# Patient Record
Sex: MALE | Race: WHITE | Employment: UNEMPLOYED | URBAN - METROPOLITAN AREA
[De-identification: names, ages, dates, MRNs, and addresses within clinical notes are randomized per-mention and may not be internally consistent; named-entity substitution may affect disease eponyms.]

---

## 2018-01-03 PROBLEM — S63.012A: Status: ACTIVE | Noted: 2018-01-03

## 2018-01-03 PROBLEM — S52.501A CLOSED FRACTURE OF RIGHT DISTAL RADIUS: Status: ACTIVE | Noted: 2018-01-03

## 2018-02-28 PROBLEM — S52.532D CLOSED COLLES' FRACTURE OF LEFT RADIUS WITH ROUTINE HEALING: Status: ACTIVE | Noted: 2018-02-28

## 2018-03-28 ENCOUNTER — TELEPHONE (OUTPATIENT)
Dept: ORTHOPEDIC SURGERY | Age: 58
End: 2018-03-28

## 2018-03-28 DIAGNOSIS — S52.532D CLOSED COLLES' FRACTURE OF LEFT RADIUS WITH ROUTINE HEALING: Primary | ICD-10-CM

## 2018-04-06 DIAGNOSIS — S52.501D CLOSED FRACTURE OF DISTAL END OF RIGHT RADIUS WITH ROUTINE HEALING, UNSPECIFIED FRACTURE MORPHOLOGY, SUBSEQUENT ENCOUNTER: Primary | ICD-10-CM

## 2018-04-11 ENCOUNTER — OFFICE VISIT (OUTPATIENT)
Dept: ORTHOPEDIC SURGERY | Age: 58
End: 2018-04-11
Payer: COMMERCIAL

## 2018-04-11 ENCOUNTER — HOSPITAL ENCOUNTER (OUTPATIENT)
Dept: GENERAL RADIOLOGY | Age: 58
Discharge: HOME | End: 2018-04-13
Payer: COMMERCIAL

## 2018-04-11 VITALS
HEART RATE: 68 BPM | HEIGHT: 71 IN | SYSTOLIC BLOOD PRESSURE: 152 MMHG | TEMPERATURE: 98 F | RESPIRATION RATE: 18 BRPM | WEIGHT: 180 LBS | DIASTOLIC BLOOD PRESSURE: 85 MMHG | BODY MASS INDEX: 25.2 KG/M2

## 2018-04-11 DIAGNOSIS — S52.501D CLOSED FRACTURE OF DISTAL END OF RIGHT RADIUS WITH ROUTINE HEALING, UNSPECIFIED FRACTURE MORPHOLOGY, SUBSEQUENT ENCOUNTER: Primary | ICD-10-CM

## 2018-04-11 DIAGNOSIS — S52.501D CLOSED FRACTURE OF DISTAL END OF RIGHT RADIUS WITH ROUTINE HEALING, UNSPECIFIED FRACTURE MORPHOLOGY, SUBSEQUENT ENCOUNTER: ICD-10-CM

## 2018-04-11 PROCEDURE — 99213 OFFICE O/P EST LOW 20 MIN: CPT | Performed by: ORTHOPAEDIC SURGERY

## 2018-04-11 PROCEDURE — 73110 X-RAY EXAM OF WRIST: CPT

## 2018-04-20 ENCOUNTER — HOSPITAL ENCOUNTER (OUTPATIENT)
Dept: OCCUPATIONAL THERAPY | Age: 58
Setting detail: THERAPIES SERIES
Discharge: HOME | End: 2018-04-20
Payer: COMMERCIAL

## 2018-04-20 PROCEDURE — 97110 THERAPEUTIC EXERCISES: CPT

## 2018-04-20 PROCEDURE — 97022 WHIRLPOOL THERAPY: CPT

## 2018-04-23 ENCOUNTER — HOSPITAL ENCOUNTER (OUTPATIENT)
Dept: OCCUPATIONAL THERAPY | Age: 58
Setting detail: THERAPIES SERIES
Discharge: HOME | End: 2018-04-23
Payer: COMMERCIAL

## 2018-04-23 PROCEDURE — 97140 MANUAL THERAPY 1/> REGIONS: CPT | Performed by: OCCUPATIONAL THERAPIST

## 2018-04-23 PROCEDURE — L3906 WHO W/O JOINTS CF: HCPCS | Performed by: OCCUPATIONAL THERAPIST

## 2018-04-23 PROCEDURE — 97760 ORTHOTIC MGMT&TRAING 1ST ENC: CPT | Performed by: OCCUPATIONAL THERAPIST

## 2018-04-23 PROCEDURE — 97022 WHIRLPOOL THERAPY: CPT | Performed by: OCCUPATIONAL THERAPIST

## 2018-04-25 ENCOUNTER — HOSPITAL ENCOUNTER (OUTPATIENT)
Dept: OCCUPATIONAL THERAPY | Age: 58
Setting detail: THERAPIES SERIES
Discharge: HOME | End: 2018-04-25
Payer: COMMERCIAL

## 2018-04-25 PROCEDURE — 97110 THERAPEUTIC EXERCISES: CPT

## 2018-04-25 PROCEDURE — 97022 WHIRLPOOL THERAPY: CPT

## 2018-04-25 PROCEDURE — 97140 MANUAL THERAPY 1/> REGIONS: CPT

## 2018-04-27 ENCOUNTER — HOSPITAL ENCOUNTER (OUTPATIENT)
Dept: OCCUPATIONAL THERAPY | Age: 58
Setting detail: THERAPIES SERIES
Discharge: HOME | End: 2018-04-27
Payer: COMMERCIAL

## 2018-04-27 PROCEDURE — 97110 THERAPEUTIC EXERCISES: CPT | Performed by: OCCUPATIONAL THERAPIST

## 2018-04-27 PROCEDURE — 97140 MANUAL THERAPY 1/> REGIONS: CPT | Performed by: OCCUPATIONAL THERAPIST

## 2018-04-27 PROCEDURE — 97022 WHIRLPOOL THERAPY: CPT | Performed by: OCCUPATIONAL THERAPIST

## 2018-04-30 ENCOUNTER — HOSPITAL ENCOUNTER (OUTPATIENT)
Dept: OCCUPATIONAL THERAPY | Age: 58
Setting detail: THERAPIES SERIES
Discharge: HOME | End: 2018-04-30
Payer: COMMERCIAL

## 2018-04-30 PROCEDURE — 97140 MANUAL THERAPY 1/> REGIONS: CPT | Performed by: OCCUPATIONAL THERAPIST

## 2018-04-30 PROCEDURE — 97018 PARAFFIN BATH THERAPY: CPT | Performed by: OCCUPATIONAL THERAPIST

## 2018-04-30 PROCEDURE — 97110 THERAPEUTIC EXERCISES: CPT | Performed by: OCCUPATIONAL THERAPIST

## 2018-05-02 ENCOUNTER — HOSPITAL ENCOUNTER (OUTPATIENT)
Dept: OCCUPATIONAL THERAPY | Age: 58
Setting detail: THERAPIES SERIES
Discharge: HOME | End: 2018-05-02
Payer: COMMERCIAL

## 2018-05-02 PROCEDURE — 97140 MANUAL THERAPY 1/> REGIONS: CPT

## 2018-05-02 PROCEDURE — 97110 THERAPEUTIC EXERCISES: CPT

## 2018-05-04 ENCOUNTER — HOSPITAL ENCOUNTER (OUTPATIENT)
Dept: OCCUPATIONAL THERAPY | Age: 58
Setting detail: THERAPIES SERIES
Discharge: HOME | End: 2018-05-04
Payer: COMMERCIAL

## 2018-05-04 PROCEDURE — 97110 THERAPEUTIC EXERCISES: CPT | Performed by: OCCUPATIONAL THERAPIST

## 2018-05-04 PROCEDURE — 97140 MANUAL THERAPY 1/> REGIONS: CPT | Performed by: OCCUPATIONAL THERAPIST

## 2018-05-04 PROCEDURE — 97022 WHIRLPOOL THERAPY: CPT | Performed by: OCCUPATIONAL THERAPIST

## 2018-05-07 ENCOUNTER — HOSPITAL ENCOUNTER (OUTPATIENT)
Dept: OCCUPATIONAL THERAPY | Age: 58
Setting detail: THERAPIES SERIES
Discharge: HOME | End: 2018-05-07
Payer: COMMERCIAL

## 2018-05-07 PROCEDURE — 97140 MANUAL THERAPY 1/> REGIONS: CPT | Performed by: OCCUPATIONAL THERAPIST

## 2018-05-07 PROCEDURE — 97022 WHIRLPOOL THERAPY: CPT | Performed by: OCCUPATIONAL THERAPIST

## 2018-05-07 PROCEDURE — 97110 THERAPEUTIC EXERCISES: CPT | Performed by: OCCUPATIONAL THERAPIST

## 2018-05-09 ENCOUNTER — HOSPITAL ENCOUNTER (OUTPATIENT)
Dept: OCCUPATIONAL THERAPY | Age: 58
Setting detail: THERAPIES SERIES
Discharge: HOME | End: 2018-05-09
Payer: COMMERCIAL

## 2018-05-09 PROCEDURE — 97110 THERAPEUTIC EXERCISES: CPT

## 2018-05-09 PROCEDURE — 97140 MANUAL THERAPY 1/> REGIONS: CPT

## 2018-05-09 PROCEDURE — 97022 WHIRLPOOL THERAPY: CPT

## 2018-05-11 ENCOUNTER — HOSPITAL ENCOUNTER (OUTPATIENT)
Dept: OCCUPATIONAL THERAPY | Age: 58
Setting detail: THERAPIES SERIES
Discharge: HOME | End: 2018-05-11
Payer: COMMERCIAL

## 2018-05-11 PROCEDURE — 97140 MANUAL THERAPY 1/> REGIONS: CPT | Performed by: OCCUPATIONAL THERAPIST

## 2018-05-11 PROCEDURE — 97022 WHIRLPOOL THERAPY: CPT | Performed by: OCCUPATIONAL THERAPIST

## 2018-05-11 PROCEDURE — 97110 THERAPEUTIC EXERCISES: CPT | Performed by: OCCUPATIONAL THERAPIST

## 2018-05-16 ENCOUNTER — HOSPITAL ENCOUNTER (OUTPATIENT)
Dept: OCCUPATIONAL THERAPY | Age: 58
Setting detail: THERAPIES SERIES
Discharge: HOME | End: 2018-05-16
Payer: COMMERCIAL

## 2018-05-16 PROCEDURE — 97140 MANUAL THERAPY 1/> REGIONS: CPT | Performed by: OCCUPATIONAL THERAPIST

## 2018-05-16 PROCEDURE — 97022 WHIRLPOOL THERAPY: CPT | Performed by: OCCUPATIONAL THERAPIST

## 2018-05-16 PROCEDURE — 97110 THERAPEUTIC EXERCISES: CPT | Performed by: OCCUPATIONAL THERAPIST

## 2018-05-18 ENCOUNTER — APPOINTMENT (OUTPATIENT)
Dept: OCCUPATIONAL THERAPY | Age: 58
End: 2018-05-18
Payer: COMMERCIAL

## 2018-05-18 ENCOUNTER — HOSPITAL ENCOUNTER (OUTPATIENT)
Dept: OCCUPATIONAL THERAPY | Age: 58
Setting detail: THERAPIES SERIES
Discharge: HOME | End: 2018-05-18
Payer: COMMERCIAL

## 2018-05-18 PROCEDURE — 97140 MANUAL THERAPY 1/> REGIONS: CPT | Performed by: OCCUPATIONAL THERAPIST

## 2018-05-18 PROCEDURE — 97022 WHIRLPOOL THERAPY: CPT | Performed by: OCCUPATIONAL THERAPIST

## 2018-05-18 PROCEDURE — 97110 THERAPEUTIC EXERCISES: CPT | Performed by: OCCUPATIONAL THERAPIST

## 2018-05-21 ENCOUNTER — HOSPITAL ENCOUNTER (OUTPATIENT)
Dept: OCCUPATIONAL THERAPY | Age: 58
Setting detail: THERAPIES SERIES
Discharge: HOME | End: 2018-05-21
Payer: COMMERCIAL

## 2018-05-21 PROCEDURE — 97110 THERAPEUTIC EXERCISES: CPT

## 2018-05-21 PROCEDURE — 97022 WHIRLPOOL THERAPY: CPT

## 2018-05-21 PROCEDURE — 97140 MANUAL THERAPY 1/> REGIONS: CPT

## 2018-05-22 ENCOUNTER — OFFICE VISIT (OUTPATIENT)
Dept: ORTHOPEDIC SURGERY | Age: 58
End: 2018-05-22
Payer: COMMERCIAL

## 2018-05-22 ENCOUNTER — APPOINTMENT (OUTPATIENT)
Dept: OCCUPATIONAL THERAPY | Age: 58
End: 2018-05-22
Payer: COMMERCIAL

## 2018-05-22 VITALS
HEIGHT: 72 IN | SYSTOLIC BLOOD PRESSURE: 141 MMHG | WEIGHT: 180 LBS | HEART RATE: 65 BPM | DIASTOLIC BLOOD PRESSURE: 88 MMHG | BODY MASS INDEX: 24.38 KG/M2

## 2018-05-22 DIAGNOSIS — S52.501D CLOSED FRACTURE OF DISTAL END OF RIGHT RADIUS WITH ROUTINE HEALING, UNSPECIFIED FRACTURE MORPHOLOGY, SUBSEQUENT ENCOUNTER: Primary | ICD-10-CM

## 2018-05-22 PROCEDURE — 99212 OFFICE O/P EST SF 10 MIN: CPT | Performed by: ORTHOPAEDIC SURGERY

## 2018-05-24 ENCOUNTER — HOSPITAL ENCOUNTER (OUTPATIENT)
Dept: OCCUPATIONAL THERAPY | Age: 58
Setting detail: THERAPIES SERIES
Discharge: HOME | End: 2018-05-24
Payer: COMMERCIAL

## 2018-05-24 PROCEDURE — 97140 MANUAL THERAPY 1/> REGIONS: CPT

## 2018-05-24 PROCEDURE — 97022 WHIRLPOOL THERAPY: CPT

## 2018-05-24 PROCEDURE — 97110 THERAPEUTIC EXERCISES: CPT

## 2018-05-29 ENCOUNTER — HOSPITAL ENCOUNTER (OUTPATIENT)
Dept: OCCUPATIONAL THERAPY | Age: 58
Setting detail: THERAPIES SERIES
Discharge: HOME | End: 2018-05-29
Payer: COMMERCIAL

## 2018-05-29 PROCEDURE — 97140 MANUAL THERAPY 1/> REGIONS: CPT

## 2018-05-29 PROCEDURE — 97110 THERAPEUTIC EXERCISES: CPT

## 2018-05-29 PROCEDURE — 97022 WHIRLPOOL THERAPY: CPT

## 2018-05-30 ENCOUNTER — HOSPITAL ENCOUNTER (OUTPATIENT)
Dept: OCCUPATIONAL THERAPY | Age: 58
Setting detail: THERAPIES SERIES
Discharge: HOME | End: 2018-05-30
Payer: COMMERCIAL

## 2018-05-30 PROCEDURE — 97140 MANUAL THERAPY 1/> REGIONS: CPT

## 2018-05-30 PROCEDURE — 97110 THERAPEUTIC EXERCISES: CPT

## 2018-05-30 PROCEDURE — 97022 WHIRLPOOL THERAPY: CPT

## 2018-06-01 ENCOUNTER — HOSPITAL ENCOUNTER (OUTPATIENT)
Dept: OCCUPATIONAL THERAPY | Age: 58
Setting detail: THERAPIES SERIES
Discharge: HOME | End: 2018-06-01
Payer: COMMERCIAL

## 2018-06-04 ENCOUNTER — HOSPITAL ENCOUNTER (OUTPATIENT)
Dept: OCCUPATIONAL THERAPY | Age: 58
Setting detail: THERAPIES SERIES
Discharge: HOME | End: 2018-06-04
Payer: COMMERCIAL

## 2018-06-04 PROCEDURE — 97110 THERAPEUTIC EXERCISES: CPT

## 2018-06-04 PROCEDURE — 97140 MANUAL THERAPY 1/> REGIONS: CPT

## 2018-06-04 PROCEDURE — 97022 WHIRLPOOL THERAPY: CPT

## 2018-06-06 ENCOUNTER — HOSPITAL ENCOUNTER (OUTPATIENT)
Dept: OCCUPATIONAL THERAPY | Age: 58
Setting detail: THERAPIES SERIES
Discharge: HOME | End: 2018-06-06
Payer: COMMERCIAL

## 2018-06-06 PROCEDURE — 97110 THERAPEUTIC EXERCISES: CPT

## 2018-06-06 PROCEDURE — 97140 MANUAL THERAPY 1/> REGIONS: CPT

## 2018-06-06 PROCEDURE — 97760 ORTHOTIC MGMT&TRAING 1ST ENC: CPT

## 2018-06-08 ENCOUNTER — HOSPITAL ENCOUNTER (OUTPATIENT)
Dept: OCCUPATIONAL THERAPY | Age: 58
Setting detail: THERAPIES SERIES
Discharge: HOME | End: 2018-06-08
Payer: COMMERCIAL

## 2018-06-08 PROCEDURE — 97022 WHIRLPOOL THERAPY: CPT | Performed by: OCCUPATIONAL THERAPIST

## 2018-06-08 PROCEDURE — 97140 MANUAL THERAPY 1/> REGIONS: CPT | Performed by: OCCUPATIONAL THERAPIST

## 2018-06-08 PROCEDURE — 97110 THERAPEUTIC EXERCISES: CPT | Performed by: OCCUPATIONAL THERAPIST

## 2018-06-11 ENCOUNTER — HOSPITAL ENCOUNTER (OUTPATIENT)
Dept: OCCUPATIONAL THERAPY | Age: 58
Setting detail: THERAPIES SERIES
Discharge: HOME | End: 2018-06-11
Payer: COMMERCIAL

## 2018-06-11 PROCEDURE — 97140 MANUAL THERAPY 1/> REGIONS: CPT

## 2018-06-11 PROCEDURE — 97110 THERAPEUTIC EXERCISES: CPT

## 2018-06-13 ENCOUNTER — HOSPITAL ENCOUNTER (OUTPATIENT)
Dept: OCCUPATIONAL THERAPY | Age: 58
Setting detail: THERAPIES SERIES
Discharge: HOME | End: 2018-06-13
Payer: COMMERCIAL

## 2018-06-13 PROCEDURE — 97110 THERAPEUTIC EXERCISES: CPT

## 2018-06-13 PROCEDURE — 97022 WHIRLPOOL THERAPY: CPT

## 2018-06-13 PROCEDURE — 97140 MANUAL THERAPY 1/> REGIONS: CPT

## 2018-06-15 ENCOUNTER — HOSPITAL ENCOUNTER (OUTPATIENT)
Dept: OCCUPATIONAL THERAPY | Age: 58
Setting detail: THERAPIES SERIES
Discharge: HOME | End: 2018-06-15
Payer: COMMERCIAL

## 2018-06-15 PROCEDURE — 97022 WHIRLPOOL THERAPY: CPT | Performed by: OCCUPATIONAL THERAPIST

## 2018-06-15 PROCEDURE — 97110 THERAPEUTIC EXERCISES: CPT | Performed by: OCCUPATIONAL THERAPIST

## 2018-06-15 PROCEDURE — 97140 MANUAL THERAPY 1/> REGIONS: CPT | Performed by: OCCUPATIONAL THERAPIST

## 2018-06-18 ENCOUNTER — HOSPITAL ENCOUNTER (OUTPATIENT)
Dept: OCCUPATIONAL THERAPY | Age: 58
Setting detail: THERAPIES SERIES
Discharge: HOME | End: 2018-06-18
Payer: COMMERCIAL

## 2018-06-18 PROCEDURE — 97110 THERAPEUTIC EXERCISES: CPT

## 2018-06-18 PROCEDURE — 97140 MANUAL THERAPY 1/> REGIONS: CPT

## 2018-06-20 ENCOUNTER — HOSPITAL ENCOUNTER (OUTPATIENT)
Dept: OCCUPATIONAL THERAPY | Age: 58
Setting detail: THERAPIES SERIES
Discharge: HOME | End: 2018-06-20
Payer: COMMERCIAL

## 2018-06-20 PROCEDURE — 97110 THERAPEUTIC EXERCISES: CPT

## 2018-06-20 PROCEDURE — 97140 MANUAL THERAPY 1/> REGIONS: CPT

## 2018-06-21 ENCOUNTER — HOSPITAL ENCOUNTER (OUTPATIENT)
Dept: OCCUPATIONAL THERAPY | Age: 58
Setting detail: THERAPIES SERIES
Discharge: HOME | End: 2018-06-21
Payer: COMMERCIAL

## 2018-06-21 PROCEDURE — 97022 WHIRLPOOL THERAPY: CPT

## 2018-06-21 PROCEDURE — 97140 MANUAL THERAPY 1/> REGIONS: CPT

## 2018-06-21 PROCEDURE — 97110 THERAPEUTIC EXERCISES: CPT

## 2018-06-25 ENCOUNTER — HOSPITAL ENCOUNTER (OUTPATIENT)
Dept: OCCUPATIONAL THERAPY | Age: 58
Setting detail: THERAPIES SERIES
Discharge: HOME | End: 2018-06-25
Payer: COMMERCIAL

## 2018-06-25 PROCEDURE — 97022 WHIRLPOOL THERAPY: CPT

## 2018-06-25 PROCEDURE — 97140 MANUAL THERAPY 1/> REGIONS: CPT

## 2018-06-25 PROCEDURE — 97110 THERAPEUTIC EXERCISES: CPT

## 2018-06-27 ENCOUNTER — OFFICE VISIT (OUTPATIENT)
Dept: ORTHOPEDIC SURGERY | Age: 58
End: 2018-06-27
Payer: COMMERCIAL

## 2018-06-27 ENCOUNTER — HOSPITAL ENCOUNTER (OUTPATIENT)
Dept: OCCUPATIONAL THERAPY | Age: 58
Setting detail: THERAPIES SERIES
Discharge: HOME | End: 2018-06-27
Payer: COMMERCIAL

## 2018-06-27 VITALS
BODY MASS INDEX: 25.2 KG/M2 | SYSTOLIC BLOOD PRESSURE: 111 MMHG | DIASTOLIC BLOOD PRESSURE: 69 MMHG | HEIGHT: 71 IN | WEIGHT: 180 LBS | HEART RATE: 89 BPM

## 2018-06-27 DIAGNOSIS — S52.532D CLOSED COLLES' FRACTURE OF LEFT RADIUS WITH ROUTINE HEALING: ICD-10-CM

## 2018-06-27 PROCEDURE — 97018 PARAFFIN BATH THERAPY: CPT

## 2018-06-27 PROCEDURE — 97140 MANUAL THERAPY 1/> REGIONS: CPT

## 2018-06-27 PROCEDURE — 99212 OFFICE O/P EST SF 10 MIN: CPT | Performed by: NURSE PRACTITIONER

## 2018-06-27 PROCEDURE — 99213 OFFICE O/P EST LOW 20 MIN: CPT | Performed by: ORTHOPAEDIC SURGERY

## 2018-06-27 PROCEDURE — 97110 THERAPEUTIC EXERCISES: CPT

## 2018-06-29 ENCOUNTER — HOSPITAL ENCOUNTER (OUTPATIENT)
Dept: OCCUPATIONAL THERAPY | Age: 58
Setting detail: THERAPIES SERIES
Discharge: HOME | End: 2018-06-29
Payer: COMMERCIAL

## 2018-06-29 PROCEDURE — 97022 WHIRLPOOL THERAPY: CPT | Performed by: OCCUPATIONAL THERAPIST

## 2018-06-29 PROCEDURE — 97140 MANUAL THERAPY 1/> REGIONS: CPT | Performed by: OCCUPATIONAL THERAPIST

## 2018-06-29 PROCEDURE — 97110 THERAPEUTIC EXERCISES: CPT | Performed by: OCCUPATIONAL THERAPIST

## 2018-07-02 ENCOUNTER — HOSPITAL ENCOUNTER (OUTPATIENT)
Dept: OCCUPATIONAL THERAPY | Age: 58
Setting detail: THERAPIES SERIES
Discharge: HOME | End: 2018-07-02
Payer: COMMERCIAL

## 2018-07-02 PROCEDURE — 97110 THERAPEUTIC EXERCISES: CPT

## 2018-07-02 PROCEDURE — 97140 MANUAL THERAPY 1/> REGIONS: CPT

## 2018-07-02 PROCEDURE — 97022 WHIRLPOOL THERAPY: CPT

## 2018-07-02 NOTE — PROGRESS NOTES
Occupational Therapy    OCCUPATIONAL THERAPY PROGRESS NOTE    Date:  2018   Initial Evaluation Date: 2018     Patient Name:  Liz Karimi                 :  1960     Restrictions/Precautions: Follow distal radius fx protocol (ORIF), low fall risk  Diagnosis: Colles' fracture of left radius, initial encounter for closed fracture, Displaced fracture of left ulna styloid process (H84.393, S52.612)                                                         Insurance/Certification information:  [facility] (Approved 18 sessions through 2018)  Referring Physician:  Dr. Rajinder Mejia MD, Osbaldo Calhoun NP  Date of Surgery/Injury: 1/3/2018 date of injury, 2018 date of surgery  Plan of care signed (Y/N):  No  Visit# / total visits: 15 / 25. . New C9 approval start date 18- 7/15/18 for 18 visits (3 x a wk)    Pain Level:  0/10 @ rest,  4 on scale of 1-10 with exercise and resistive use, aching, pressure and tight (pulling)    Subjective: Pt reports, \" I saw the doctor last week again and he was real happy with my progress. \"   \"I can almost grasp the clutch on my motorcycle now. \"    Objective:      INTERVENTION: COMPLETED: SPECIFICS/COMMENTS:   Modality:     Fluido WP x 15 minutes start of session for decreasing stiffness hand & wrist   Parabath tx with fingers wrapped in flexion  10 min for soft tissue warm-up start of session while moving his wrist and fingers. AROM:     Forearm, wrist & digits, all planes  Tenodesis. ..  hook fist wrapping around a 1# dumbbell   Towel Scrunches  5 mins   Small particle bin  5 mins to grasping beans and placing them into a different container. After PROM stretch   Wrist-o-sizer  5 mins   Digits flexor tendon gliding ex's x All positions with end range hand on stretches. AAROM:               PROM/Stretching:     Digital PROM x Completed with 60 sec holds within pain tolerance.    Wrist extension/ flexor mass focused aggressive x Completed to pain tolerance with 60 Potential: Good  -Requires OT Follow Up: Yes  Time In: 10:30 AM            Time Out:  11:35 AM           Visit #: 13 /18    Treatment Charges: Mins Units   Modalities- WP 15 1   Ther Exercise 35 2   Manual Therapy 15 1   Thera Activities     ADL/Home Mgt      Neuro Re-education     Gait Training     Group Therapy     Non-Billable Service Time     Other- splint mike     Total Time/Units 65 4     GOALS (Long term same as Short term): 6 weeks  1) Patient will demonstrate good understanding of home program (exercises/activities/diagnosis/prognosis/goals) with good accuracy. Goal Met  2) Patient will demonstrate increased active/passive range of motion of their Left wrist by at least 10* all applicable planes for ADL/IADL completion. Progressing well  3) Patient will improve ROM to his L hand digits as evidenced by ability to bring tips of finger to 2.0 cm from his [facility] so he can engage in work related tasks. Goal Met  4) Patient will demonstrate increased /pinch strength of at least 10 / 5 pinch pounds of their Left hand. Goal Met  5) Patient to report decreased pain in their affected left upper extremity from 5/10 to 3/10 or less with resistive functional use. Progressing well  6) Patient to report 100% compliance with their splint wear, care, and precautions if needed. Goal Met  7) Patient will return to work at an independent level by OT discharge. Progressing well    Comment: Hand Dominance is Right.          Plan:   [x]  Continues Plan of care: Pt education continues at each visit to obtain maximum benefits from skilled OT intervention. C-9 approval - see above.    []  Alter Plan of care:   []  Discharge:      Hayde Tellez

## 2018-07-03 ENCOUNTER — HOSPITAL ENCOUNTER (OUTPATIENT)
Dept: OCCUPATIONAL THERAPY | Age: 58
Setting detail: THERAPIES SERIES
Discharge: HOME | End: 2018-07-03
Payer: COMMERCIAL

## 2018-07-03 PROCEDURE — 97140 MANUAL THERAPY 1/> REGIONS: CPT

## 2018-07-03 PROCEDURE — 97022 WHIRLPOOL THERAPY: CPT

## 2018-07-03 PROCEDURE — 97110 THERAPEUTIC EXERCISES: CPT

## 2018-07-03 NOTE — PROGRESS NOTES
Occupational Therapy    OCCUPATIONAL THERAPY PROGRESS NOTE    Date:  7/3/2018   Initial Evaluation Date: 2018     Patient Name:  Casimiro Clement                 :  1960     Restrictions/Precautions: Follow distal radius fx protocol (ORIF), low fall risk  Diagnosis: Colles' fracture of left radius, initial encounter for closed fracture, Displaced fracture of left ulna styloid process (J56.507, S52.802)                                                         Insurance/Certification information:  [facility] (Approved 18 sessions through 2018)  Referring Physician:  Dr. Ryder Cabral MD, Kathe Richard NP  Date of Surgery/Injury: 1/3/2018 date of injury, 2018 date of surgery  Plan of care signed (Y/N):  No  Visit# / total visits: 15 / 25. . New C9 approval start date 18- 7/15/18 for 18 visits (3 x a wk)    Pain Level:  0/10 @ rest,  4 on scale of 1-10 with exercise and resistive use, aching, pressure and tight (pulling)    Subjective: Pt reports, \" My hand was real tired after yesterdays session. \"    Objective:      INTERVENTION: COMPLETED: SPECIFICS/COMMENTS:   Modality:     Fluido WP x 15 minutes start of session for decreasing stiffness hand & wrist   Parabath tx with fingers wrapped in flexion  10 min for soft tissue warm-up start of session while moving his wrist and fingers. AROM:     Forearm, wrist & digits, all planes  Tenodesis. ..  hook fist wrapping around a 1# dumbbell   Towel Scrunches  5 mins   Small particle bin  5 mins to grasping beans and placing them into a different container. After PROM stretch   Wrist-o-sizer  5 mins   Digits flexor tendon gliding ex's x All positions with end range hand on stretches. AAROM:               PROM/Stretching:     Digital PROM x Completed with 60 sec holds within pain tolerance. Wrist extension/ flexor mass focused aggressive x Completed to pain tolerance with 60 sec holds. Theraband to aggressively flexor mass stretch with at home. Mickeal Rink Mickeal Rink Mickeal Rink Mickeal Rink pushing against therapists hand while standing   Forearm, wrist & digits ( fist focused), all planes x With contract/ relax res ex's to gain PROM   Scar Mass/Edema Control:     Retrograde (focus digits)  x         Strengthening:     Hands on digital & wrist resistive ex's x Focus flex/ext for fisting, wrist, forearm   5# putty tub WB and grasping ex's x    UBE x 8 minutes   2.5 res, uni & bi/ back & forward- focused grasping    2# hand ball ex's x Tosses uni & bi and bounce back over & under hand   cybex  High row - Bilaterally 2 plates 2 sets of 25, chest press L alone 2 plates 2 sets of 25, chest across ( lat pull) L alone 2 plates 2 sets of 25    5# dumbbell as tolerated x Used to complete wrist strengthening in all planes and forearm rotation x 2 sets of 20 reps. Biceps curls and triceps ext - 2 sets of 20. [place] T-bar- Green x Completed wrist & forearm strengthening all planes, hold with R bend forward L    Hand gripper x Used black spring on level 3; 2 sets of 25 reps. Other:     Dynamic wrist flexion splint/ flexion glove.  x Pt is to wear his dynamic wrist flexion splint at least 2x a day for 30 min sessions- pt trying to wear glove more then 2 x's a day. Custom orthoplast static wrist & digit extension splint for nighttime use  x  Pt to wear at night. Pt to cont to utilize fisting devices during day. Readjusted this date to increase extension force digitally     Assessment/Comments: Pt is making Good progress toward stated plan of care. Pt tolerated well. AROM & Strength cont to improve.      Dynamometer (setting 3):  @ IE    5/29/17 6/11/18 6/20/18                            Left: 1#               20#               30#     37#              Right: 40#       40#  40#  45#     AROM 6/20/18 : Left Wrist Flexion 40*  , Wrist Extension 60*       -Rehab Potential: Good  -Requires OT Follow Up: Yes  Time In: 10:30 AM            Time Out:  11:35 AM           Visit #: 14 /18    Treatment Charges: Mins Units

## 2018-07-06 ENCOUNTER — HOSPITAL ENCOUNTER (OUTPATIENT)
Dept: OCCUPATIONAL THERAPY | Age: 58
Setting detail: THERAPIES SERIES
Discharge: HOME | End: 2018-07-06
Payer: COMMERCIAL

## 2018-07-06 PROCEDURE — 97140 MANUAL THERAPY 1/> REGIONS: CPT | Performed by: OCCUPATIONAL THERAPIST

## 2018-07-06 PROCEDURE — 97018 PARAFFIN BATH THERAPY: CPT | Performed by: OCCUPATIONAL THERAPIST

## 2018-07-06 PROCEDURE — 97110 THERAPEUTIC EXERCISES: CPT | Performed by: OCCUPATIONAL THERAPIST

## 2018-07-06 NOTE — PROGRESS NOTES
hand while standing   Forearm, wrist & digits ( fist focused), all planes x With contract/ relax res ex's to gain PROM   Scar Mass/Edema Control:     Retrograde (focus digits)  x         Strengthening:     Hands on digital & wrist resistive ex's x Focus flex/ext for fisting, wrist, forearm   5# putty tub WB and grasping ex's x    UBE x 8 minutes   2.5 res, uni & bi/ back & forward- focused grasping    2# hand ball ex's  Tosses uni & bi and bounce back over & under hand   cybex  High row - Bilaterally 2 plates 2 sets of 25, chest press L alone 2 plates 2 sets of 25, chest across ( lat pull) L alone 2 plates 2 sets of 25    5# dumbbell as tolerated x Used to complete wrist strengthening in all planes and forearm rotation x 2 sets of 20 reps. Biceps curls and triceps ext - 2 sets of 20. [place] T-bar- Green x Completed wrist & forearm strengthening all planes, hold with R bend forward L    Hand gripper x Used black spring on level 3; 2 sets of 25 reps. Added isometric contraction. Other:     Dynamic wrist flexion splint/ flexion glove.  x Pt is to wear his dynamic wrist flexion splint at least 2x a day for 30 min sessions- pt trying to wear glove more then 2 x's a day. Custom orthoplast static wrist & digit extension splint for nighttime use  x  Pt to wear at night. Pt to cont to utilize fisting devices during day. Readjusted this date to increase extension force digitally     Assessment/Comments: Pt is making Good progress toward stated plan of care. Pt tolerated well.  AROM & Strength cont to improve.     -Rehab Potential: Good  -Requires OT Follow Up: Yes  Time In: 10:35 AM            Time Out:  11:35 AM           Visit #: 15 /18    Treatment Charges: Mins Units   Modalities- [place]affin 10 1   Ther Exercise 20 1   Manual Therapy 30 2   Thera Activities     ADL/Home Mgt      Neuro Re-education     Gait Training     Group Therapy     Non-Billable Service Time     Other- splint mike     Total Time/Units 60 4     GOALS

## 2018-07-09 ENCOUNTER — HOSPITAL ENCOUNTER (OUTPATIENT)
Dept: OCCUPATIONAL THERAPY | Age: 58
Setting detail: THERAPIES SERIES
End: 2018-07-09
Payer: COMMERCIAL

## 2018-07-09 ENCOUNTER — HOSPITAL ENCOUNTER (OUTPATIENT)
Dept: OCCUPATIONAL THERAPY | Age: 58
Setting detail: THERAPIES SERIES
Discharge: HOME | End: 2018-07-09
Payer: COMMERCIAL

## 2018-07-09 PROCEDURE — 97140 MANUAL THERAPY 1/> REGIONS: CPT

## 2018-07-09 PROCEDURE — 97022 WHIRLPOOL THERAPY: CPT

## 2018-07-09 PROCEDURE — 97110 THERAPEUTIC EXERCISES: CPT

## 2018-07-09 NOTE — PROGRESS NOTES
stretch with at home. .. [facility] pushing against therapists hand while standing   Forearm, wrist & digits ( fist focused), all planes x With contract/ relax res ex's to gain PROM   Scar Mass/Edema Control:     Retrograde (focus digits)  x         Strengthening:     Hands on digital & wrist resistive ex's x Focus flex/ext for fisting, wrist, forearm   5# putty tub WB and grasping ex's x    UBE x 8 minutes   2.5 res, uni & bi/ back & forward- focused grasping    2# hand ball ex's  Tosses uni & bi and bounce back over & under hand   cybex  High row - Bilaterally 2 plates 2 sets of 25, chest press L alone 2 plates 2 sets of 25, chest across ( lat pull) L alone 2 plates 2 sets of 25    5# dumbbell as tolerated x Used to complete wrist strengthening in all planes and forearm rotation x 2 sets of 20 reps. Biceps curls and triceps ext - 2 sets of 20. [place] T-bar- Green x Completed wrist & forearm strengthening all planes, hold with R bend forward L    Hand gripper x Used black spring on level 3; 2 sets of 25 reps. Added isometric contraction. Other:     Dynamic wrist flexion splint/ flexion glove.  x Pt is to wear his dynamic wrist flexion splint at least 2x a day for 30 min sessions- pt trying to wear glove more then 2 x's a day. Custom orthoplast static wrist & digit extension splint for nighttime use  x  Pt to wear at night. Pt to cont to utilize fisting devices during day. Readjusted this date to increase extension force digitally     Assessment/Comments: Pt is making Good progress toward stated plan of care. Pt tolerated well.  AROM & Strength cont to improve.     -Rehab Potential: Good  -Requires OT Follow Up: Yes  Time In: 1:00 PM            Time Out: 2:00 PM           Visit #: 15 /18    Treatment Charges: Mins Units   Modalities- WP 15 1   Ther Exercise 30 2   Manual Therapy 20 1   Thera Activities     ADL/Home Mgt      Neuro Re-education     Gait Training     Group Therapy     Non-Billable Service Time     Other- splint mike     Total Time/Units 60 4     GOALS (Long term same as Short term): 6 weeks  1) Patient will demonstrate good understanding of home program (exercises/activities/diagnosis/prognosis/goals) with good accuracy. Goal progress. We continue to add to HEP as able. 2) Patient will demonstrate increased active/passive range of motion of their Left wrist by at least 10* all applicable planes for ADL/IADL completion. Progressing well  3) Patient will improve ROM to his L hand digits as evidenced by ability to bring tips of finger to 2.0 cm from his [facility] so he can engage in work related tasks. Goal Met    Updated goal: Patient will improve L hand digital motion as evidenced by ability to make a full fist so he can return to meaningful occupation. 4) Patient will demonstrate increased /pinch strength of at least 10 / 5 pinch pounds of their Left hand. Goal Met   Updated goal. Pt will increased /pinch strength to 75% that of his unaffected R UE so he can engage in work related tasks. 5) Patient to report decreased pain in their affected left upper extremity from 5/10 to 3/10 or less with resistive functional use. Progressing well  6) Patient to report 100% compliance with their splint wear, care, and precautions if needed. Goal Met  7) Patient will return to work at an independent level by OT discharge. Progressing well    Plan:   [x]  Continues Plan of care: Pt education continues at each visit to obtain maximum benefits from skilled OT intervention. C-9 approval - see above.    []  Alter Plan of care:   []  Discharge:      Verónica VYAS/CRISTIANA, [id number]

## 2018-07-11 ENCOUNTER — HOSPITAL ENCOUNTER (OUTPATIENT)
Dept: OCCUPATIONAL THERAPY | Age: 58
Setting detail: THERAPIES SERIES
Discharge: HOME | End: 2018-07-11
Payer: COMMERCIAL

## 2018-07-11 ENCOUNTER — TELEPHONE (OUTPATIENT)
Dept: ORTHOPEDIC SURGERY | Age: 58
End: 2018-07-11

## 2018-07-11 PROCEDURE — 97110 THERAPEUTIC EXERCISES: CPT

## 2018-07-11 PROCEDURE — 97022 WHIRLPOOL THERAPY: CPT

## 2018-07-11 PROCEDURE — 97140 MANUAL THERAPY 1/> REGIONS: CPT

## 2018-07-12 NOTE — PROGRESS NOTES
Occupational Therapy    OCCUPATIONAL THERAPY PROGRESS NOTE    Date:  18        Initial Evaluation Date: 2018     Patient Name:  Hanna Travis                 :  1960     Restrictions/Precautions: Follow distal radius fx protocol (ORIF), low fall risk  Diagnosis: Colles' fracture of left radius, initial encounter for closed fracture, Displaced fracture of left ulna styloid process (B05.101, S52.612)                                                         Insurance/Certification information:  [facility] (Approved 18 sessions through 2018)  Referring Physician:  Dr. Ariana Myers MD, Irene Basurto NP  Date of Surgery/Injury: 1/3/2018 date of injury, 2018 date of surgery  Plan of care signed (Y/N):  No  Visit# / total visits: . . New C9 approval start date 18- 7/15/18 for 18 visits (3 x a wk)    Pain Level:  0/10 @ rest,  4 on scale of 1-10 with exercise and resistive use, aching, pressure and tight (pulling)    Subjective: Pt states, \" I remembered to bring my splint in because you wanted to adjust the finger pull today. \"    Objective:      INTERVENTION: COMPLETED: SPECIFICS/COMMENTS:   Modality:     Fluido WP  15 minutes start of session for decreasing stiffness hand & wrist   Parabath tx with fingers wrapped in flexion x 10 min for soft tissue warm-up start of session while moving his wrist and fingers. AROM:     Forearm, wrist & digits, all planes  Tenodesis. ..  hook fist wrapping around a 1# dumbbell   Towel Scrunches  5 mins   Small particle bin  5 mins to grasping beans and placing them into a different container. After PROM stretch   Wrist-o-sizer  5 mins   Digits flexor tendon gliding ex's x All positions with end range hand on stretches. AAROM:               PROM/Stretching:     Digital PROM/ PIP jt extension  x Completed with 60 sec holds within pain tolerance. Wrist extension/ flexor mass focused aggressive x Completed to pain tolerance with 60 sec holds.

## 2018-07-13 ENCOUNTER — HOSPITAL ENCOUNTER (OUTPATIENT)
Dept: OCCUPATIONAL THERAPY | Age: 58
Setting detail: THERAPIES SERIES
Discharge: HOME | End: 2018-07-13
Payer: COMMERCIAL

## 2018-07-13 PROCEDURE — 97110 THERAPEUTIC EXERCISES: CPT | Performed by: OCCUPATIONAL THERAPIST

## 2018-07-13 PROCEDURE — 97022 WHIRLPOOL THERAPY: CPT | Performed by: OCCUPATIONAL THERAPIST

## 2018-07-13 PROCEDURE — 97140 MANUAL THERAPY 1/> REGIONS: CPT | Performed by: OCCUPATIONAL THERAPIST

## 2018-07-13 NOTE — PROGRESS NOTES
Occupational Therapy    OCCUPATIONAL THERAPY PROGRESS NOTE  Possible Discharge Note - pt's C-9 is completed. Will leave chart on hold for 4-6 weeks in case he gets another C-9 submitted and approved. Date:  18                              Initial Evaluation Date: 2018     Patient Name:  Mayra Rodríguez                 :  1960     Restrictions/Precautions: Follow distal radius fx protocol (ORIF), low fall risk  Diagnosis: Colles' fracture of left radius, initial encounter for closed fracture, Displaced fracture of left ulna styloid process (J42.103, S52.612)                                                         Insurance/Certification information:  [facility] (Approved 18 sessions through 2018)  Referring Physician:  Dr. Teresa Harley MD, Jose D Orozco NP  Date of Surgery/Injury: 1/3/2018 date of injury, 2018 date of surgery  Plan of care signed (Y/N):  No  Visit# / total visits: . . New C9 approval start date 18- 7/15/18 for 18 visits (3 x a wk)   18 previous sessions used. Pain Level:  0/10 @ rest,  4 on scale of 1-10 with exercise and resistive use, aching, pressure and tight (pulling)    Subjective: Pt states, \" I still wish I could make a fist better. \"    Objective:      INTERVENTION: COMPLETED: SPECIFICS/COMMENTS:   Modality:     Fluido WP x 15 minutes start of session for decreasing stiffness hand & wrist   Parabath tx with fingers wrapped in flexion  10 min for soft tissue warm-up start of session while moving his wrist and fingers. AROM:     Forearm, wrist & digits, all planes  Tenodesis. ..  hook fist wrapping around a 1# dumbbell   Towel Scrunches  5 mins   Small particle bin  5 mins to grasping beans and placing them into a different container. After PROM stretch   Wrist-o-sizer  5 mins   Digits flexor tendon gliding ex's x All positions with end range hand on stretches.     AAROM:               PROM/Stretching:     Digital PROM/ PIP jt extension  x Completed with 60 sec holds within pain tolerance. Wrist extension/ flexor mass focused aggressive x Completed to pain tolerance with 60 sec holds. Theraband to aggressively flexor mass stretch with at home. .. Ana Lilia Madi Ana Lilia Madi pushing against therapists hand while standing   Forearm, wrist & digits ( fist focused), all planes x With contract/ relax res ex's to gain PROM   Scar Mass/Edema Control:     Retrograde (focus digits)  x         Strengthening:     Hands on digital & wrist resistive ex's x Focus flex/ext for fisting, wrist, forearm   5# putty tub WB and grasping ex's x    UBE x 8 minutes   2.5 res, uni & bi/ back & forward- focused grasping    2# hand ball ex's  Tosses uni & bi and bounce back over & under hand   cybex  High row - Bilaterally 2 plates 2 sets of 25, chest press L alone 2 plates 2 sets of 25, chest across ( lat pull) L alone 2 plates 2 sets of 25    4# & 5# dumbbell as tolerated x Used to complete wrist strengthening in all planes and forearm rotation x 2 sets of 20 reps. Biceps curls and triceps ext - 2 sets of 20. [place] T-bar- Green x Completed wrist & forearm strengthening all planes, hold with R bend forward L    Hand gripper x Used black spring on level 3; 2 sets of 30 reps & isometric contraction. Other:     Dynamic wrist flexion splint/ flexion glove.  x Pt is to wear his dynamic wrist flexion splint at least 2x a day for 30 min sessions- pt trying to wear glove more then 2 x's a day. Custom orthoplast static wrist & digit extension splint for nighttime use  x  Pt to wear at night. Pt to cont to utilize fisting devices during day. Readjusted this date again to increase extension force digitally -at tips with firm blue foam     Assessment/Comments: Pt is making Good progress toward stated plan of care. Pt tolerated well. AROM & Strength cont to improve.      Left Wrist ROM:  Forearm Pron 0-90:                            0*-64* - 70*  Forearm Supination  0-90:                  0*-71* - 75*  Wrist Flexion 0-80:                              0*-19* - 41*  Wrist Extension 0-70:                          0*-43* - 51*  Wrist Radial Deviation 0-20:               0*-16* - 18*  Wrist Ulnar Deviation 0-45:                 0*-20* - 35*  Comment: Hand Dominance is Right. [facility] are as follows: IF: 5.0 cm to 3.0 cm, MF: 6.0 - 3.0 cm, RF: 5.3 - 3.0 cm, SF: 4.0  - 2.5 cm. After stretching and warming up his [facility] increases to within 2 cm from [facility]. PIP extension IF - 18*,  MF - 24*, RF -23*, SF -32*.      Dynamometer (setting 2):                              Left: 1#   - 45#                                               Right: 40#   - 58#                  Pinch Meter:              Lateral: Left= 10#- 18#,      Right= 21# -               [place] 3 point: Left= 3#- 14#,    Right= 17.5#    -Rehab Potential: Good  -Requires OT Follow Up: Yes  Time In: 10:30 AM            Time Out: 11:35 AM           Visit #: 17 /18    Treatment Charges: Mins Units   Modalities- WP 15 1   Ther Exercise 35 2   Manual Therapy 15 1   Thera Activities     ADL/Home Mgt      Neuro Re-education     Gait Training     Group Therapy     Non-Billable Service Time     Other- splint mike     Total Time/Units 65 4     GOALS (Long term same as Short term): 6 weeks  1) Patient will demonstrate good understanding of home program (exercises/activities/diagnosis/prognosis/goals) with good accuracy. Goal Met. Pt coorperative. 2) Patient will demonstrate increased active/passive range of motion of their Left wrist by at least 10* all applicable planes for ADL/IADL completion. Goal Met. See above. 3) Patient will improve ROM to his L hand digits as evidenced by ability to bring tips of finger to 2.0 cm from his [facility] so he can engage in work related tasks. Goal Partially Met. Pt cannot make a full fist with out waming up or after PROM stretching. 4) Patient will demonstrate increased /pinch strength of at least 10 / 5 pinch pounds of their Left hand.

## 2018-07-25 ENCOUNTER — TELEPHONE (OUTPATIENT)
Dept: ORTHOPEDIC SURGERY | Age: 58
End: 2018-07-25

## 2018-07-25 DIAGNOSIS — S52.532D CLOSED COLLES' FRACTURE OF LEFT RADIUS WITH ROUTINE HEALING: Primary | ICD-10-CM

## 2018-08-06 ENCOUNTER — HOSPITAL ENCOUNTER (OUTPATIENT)
Dept: PHYSICAL THERAPY | Age: 58
Setting detail: THERAPIES SERIES
Discharge: HOME | End: 2018-08-06
Payer: COMMERCIAL

## 2018-08-08 NOTE — PROGRESS NOTES
Frequency   Weighted Activities  Observed Effort Level Position/Ambulation  Quantitative + Qualitative Results   % of Workday   NEVER Contraindicated  Not Possible 0%    RARELY Maximum Significant Limitation 1-5%     OCCASIONALLY Heavy Some Limitation 6-33%     FREQUENTLY Low Slight/No Limitation 34-66%    SELF LIMITED Client stopped test; sub-maximum effort level Sub-max %      Lifting, Strength   (lbs) Unable  (Never) Max. (Rare)  0-5% day Heavy  (Occas)  6-33% day Low  (Freq)  34-66% day Activity Limitations Recommendations   Waist to Floor Lift   65# 40# 0-25# General deconditioning,   Keep lifts between knee and waist levels, WC program for strength and conditioning   Waist to Crown Lifting   30# 20# 0-10# General deconditioning, LT wrist radial deviation ROM impairment, LTUE deconditioning Keep lifts mid chest to lower pec levels, WC program for conditioning            Front Carry (Long) 50   60# 40# 0-25# General deconditioning,  WC (work conditioning) program for conditioning   Push-Pull (Static) Force Generated Activity Limitations    Recommendations   Push Forces      50# Ave  LT wrist load limitations/compression force tolerances. Lighten loads pushed, elec push carts. Pull Forces       [address] No Limitations    (There are numerous variables impacting Push/Pull including load, equipment, surface, etc.  This is not meant to indicate the weight that is moved. )    Posture, Flexibility, Ambulation Unable  (Never) Significant   Limitation  (Rare)  0-5% day Some Limitation  (Occas)  6-33%day No Limitation   (Freq)  34-66% day Activity Limitations Recommendations   Elevated Work   X LT X RTUE LT wrist ROM impairments  Keep work eye levels or lower,             Fwd bending Standing        X No Limitation    Sitting Tolerances     X No Limitation    Standing Work     X  No Limitation    Static Standing      NT    Walking- 6MWT       X No Limitation, No dyspnea or pain     [place] Position Tolerances      X  No Limitation       Kneeling    Tolerances      X No Limitation                  Stair climbing    X No Limitation, safe task     Step Ladder Climbing    X General deconditioning,                  Hand/Finger Strength Force Generated  (pounds) Mean for Age/Gender Values for approx 2/3 of this age/gender group range  Activity Limitation Recommendations   Hand  Right   60#   No Limitation, solid power grasp    Hand  Left   50#    Deconditioned LTUE  program for conditioning     Coordination Standard Test: Performances Activity Limitation Recommendations    Round Blocks Dominant Hand  Gross Motor   Manipulations Manipulate and place 3 round blocks   WFL hand coordinations No Limitation, no ataxias or dysmetrias      Round Blocks NonDominant   Gross Motor  Manipulations Manipulate and place 3 round blocks  WFL hand coordinations End range AROM impairments LT wrist/hand leading to increased LT elbow/shoulder loading/usage                    Peg Board   Dominant Hand Grasping and placing small washers, pegs and tubes   WFL hand coordinations  No Limitation, no ataxias or dysmetrias      Peg Board NonDominant Hand Grasping and placing small washers, pegs and tubes  WFL hand coordinations  End range AROM impairments LT wrist/hand leading to increased LT elbow/shoulder loading/usage

## 2018-08-08 NOTE — PROGRESS NOTES
180#   RHD   C-Lumbar AROM WNL all joints/planes    AROM LT wrist flex 45*, Ext 40*, RD 20*, UD 15*. LT elbow and shoulder WNL all planes.  AROM RTUE as well as BLEs WNL all Jts/planes   MMT globally B/L UE/LE all jts/planes 5/5   MM atrophy LT forearm vs RT   Unable to fully oppose LT hand to full/approx. 10-15% impairment in full grasp.  Gait normal without use AD   Alert, awake and oriented with fluent speech   Recent and remote memory good   Attention span, concentration and fund of knowledge are normal   Vision is full to confrontation   Spinal curves are [facility]   Hearing is intact for conversation   No edema UE/LEs   No tremors, ataxias or dysmetrias   Constitutional: alert, oriented and cooperative    PMH/PSH:   DM   HTN   Plantar fasciitis Sx RT   LT distal wrist colles Fx with ORIF sx Dr. Thuan Roger 1/18/18    Chief Complaints/Activity Limitations:   Reporting only stiffness LT wrist/hand and just unable to make full fist.  Reports no real pain LT wrist even with high level home activities.  Reports no post activity/usage pain LT hand/wrist.    Effort and Cooperation:   The clients patterns of movement and physiological responses were consistent with maximal efforts.  The client demonstrated cooperative behaviors and was willing to work to maximal abilities in all test items.  No pseudo neurological symptoms subjectively expressed.  No preoccupation with symptoms.  No factitious behaviors throughout physical exam and FCE testing.  No medically unexplained physical symptoms (MUPS) expressed.  No fear avoidance and somatic anxiety behaviors.  No magnified misleading behaviors. Consistency of Performances:   The clients performances were consistent among similar test items (similar test items had similar performances).  Solid biologic plausibility for functional limitations   Performances have objective reasoning for functional limitations.    Able to link capacities/limitations    Functional Limitations of Performances:   Functional limitations with LT hand manipulation precision tasking, machine paced assembly tasking due to AROM impairments in all planes LT wrist.  Can manipulate with fingers without limitations but activities requiring full range wrist positions may become difficult. Compensates with LT elbow/shoulder to improve hand manipulation tasking but may lead to LT elbow/shoulder injury.  Functional limitations with lifting objects above shoulder levels due to ROM impairments in LT wrist in radial deviation to allow upper level load lifting. Coupling of hands and object becomes important measure to allow such lifts.  Functional impairments in LTUE pushing/loading tolerances and power acquisition.  Functional impairments in overhead hand manipulations LT hand due to MM deconditioning LT hand/forearm/shoulder as well as ROM impairments in LT wrist all planes. Can accomplish tasking if self-paced and productivity standards lower levels until conditioned.  Any gripping forces > 50# LT hand (RT hand 60#)    Summary/Recommendations:     Begin Work Conditioning program x 4-6 weeks for general strength/conditioning   Follow up FCE to identify barriers for RTW as well as any continued risk factors for RTW decisions.    Meets Medium physical strength levels/DOT    Evaluator Signature: _____________________________  Date:                           _______________

## 2018-08-15 ENCOUNTER — OFFICE VISIT (OUTPATIENT)
Dept: ORTHOPEDIC SURGERY | Age: 58
End: 2018-08-15
Payer: COMMERCIAL

## 2018-08-15 ENCOUNTER — HOSPITAL ENCOUNTER (OUTPATIENT)
Dept: GENERAL RADIOLOGY | Age: 58
Discharge: HOME | End: 2018-08-17
Payer: COMMERCIAL

## 2018-08-15 VITALS
TEMPERATURE: 98.1 F | BODY MASS INDEX: 25.2 KG/M2 | WEIGHT: 180 LBS | HEIGHT: 71 IN | DIASTOLIC BLOOD PRESSURE: 80 MMHG | SYSTOLIC BLOOD PRESSURE: 118 MMHG | RESPIRATION RATE: 16 BRPM

## 2018-08-15 DIAGNOSIS — S52.532D CLOSED COLLES' FRACTURE OF LEFT RADIUS WITH ROUTINE HEALING: ICD-10-CM

## 2018-08-15 DIAGNOSIS — S52.532D CLOSED COLLES' FRACTURE OF LEFT RADIUS WITH ROUTINE HEALING: Primary | ICD-10-CM

## 2018-08-15 PROCEDURE — 99213 OFFICE O/P EST LOW 20 MIN: CPT | Performed by: ORTHOPAEDIC SURGERY

## 2018-08-15 PROCEDURE — 99212 OFFICE O/P EST SF 10 MIN: CPT | Performed by: ORTHOPAEDIC SURGERY

## 2018-08-15 PROCEDURE — 73110 X-RAY EXAM OF WRIST: CPT

## 2018-08-22 NOTE — PROGRESS NOTES
Subjective: Patient's here for follow-up for his left distal radius fracture. He recently had a functional capacity evaluation which showed that he did have limitations. He is not rated return to his previous position of employment. He is here today for evaluation. Extremity make improvement as able. They did not approve this last physical therapy. He denies any further trauma or injury.       Review of Systems - General ROS: negative for - chills, fatigue, fever, malaise or night sweats  Ophthalmic ROS: negative for - blurry vision, decreased vision, double vision, dry eyes, excessive tearing, eye pain or loss of vision  ENT ROS: negative for - headaches, hearing change, nasal congestion, sinus pain, sore throat, tinnitus or vertigo  Allergy and Immunology ROS: negative for - itchy/watery eyes, nasal congestion, postnasal drip or seasonal allergies  Hematological and Lymphatic ROS: negative for - bleeding problems, blood clots, bruising, fatigue, jaundice, night sweats or swollen lymph nodes  Endocrine ROS: negative for - mood swings, palpitations, polydipsia/polyuria, skin changes or temperature intolerance  Respiratory ROS: no cough, shortness of breath, or wheezing  Cardiovascular ROS: no chest pain or dyspnea on exertion  Gastrointestinal ROS: no abdominal pain, change in bowel habits, or black or bloody stools  Genito-Urinary ROS: no dysuria, trouble voiding, or hematuria  Musculoskeletal ROS: Left wrist and finger pain and stiffness  Neurological ROS: no TIA or stroke symptoms        Objective:  Physical Examination: General appearance - alert, well appearing, and in no distress, oriented to person, place, and time and overweight  Mental status - alert, oriented to person, place, and time, normal mood, behavior, speech, dress, motor activity, and thought processes  Left wrist   Skin intact surgical incision well-healed   Fingertips 5 mm off the palm cold, touching the palm after warmed up   Wrist range of

## 2018-10-03 ENCOUNTER — OFFICE VISIT (OUTPATIENT)
Dept: ORTHOPEDIC SURGERY | Age: 58
End: 2018-10-03
Payer: COMMERCIAL

## 2018-10-03 VITALS
HEART RATE: 73 BPM | DIASTOLIC BLOOD PRESSURE: 84 MMHG | WEIGHT: 180 LBS | HEIGHT: 71 IN | SYSTOLIC BLOOD PRESSURE: 126 MMHG | BODY MASS INDEX: 25.2 KG/M2

## 2018-10-03 DIAGNOSIS — S52.532D CLOSED COLLES' FRACTURE OF LEFT RADIUS WITH ROUTINE HEALING: Primary | ICD-10-CM

## 2018-10-03 PROCEDURE — 99212 OFFICE O/P EST SF 10 MIN: CPT

## 2018-10-03 PROCEDURE — 99213 OFFICE O/P EST LOW 20 MIN: CPT | Performed by: ORTHOPAEDIC SURGERY

## 2018-10-03 RX ORDER — MELOXICAM 15 MG/1
15 TABLET ORAL DAILY
Qty: 30 TABLET | Refills: 1 | Status: SHIPPED
Start: 2018-10-03 | End: 2020-08-26

## 2018-10-03 NOTE — PROGRESS NOTES
Subjective: Patient's here for follow-up for his left distal radius fracture. He was denied his recent occupational therapy but has continued to work very hard at home. His range of motion is improved significantly. He does have some residual stiffness. He is having some left elbow and shoulder pain although he thinks it may be from favoring his left upper extremity. He would like to get working in work conditioning therapy to try to get back to normal life. Denies any recent trauma or falls.     Review of Systems - General ROS: negative for - chills, fatigue, fever, malaise or night sweats  Ophthalmic ROS: negative for - blurry vision, decreased vision, double vision, dry eyes, excessive tearing, eye pain or loss of vision  ENT ROS: negative for - headaches, hearing change, nasal congestion, sinus pain, sore throat, tinnitus or vertigo  Allergy and Immunology ROS: negative for - itchy/watery eyes, nasal congestion, postnasal drip or seasonal allergies  Hematological and Lymphatic ROS: negative for - bleeding problems, blood clots, bruising, fatigue, jaundice, night sweats or swollen lymph nodes  Endocrine ROS: negative for - mood swings, palpitations, polydipsia/polyuria, skin changes or temperature intolerance  Respiratory ROS: no cough, shortness of breath, or wheezing  Cardiovascular ROS: no chest pain or dyspnea on exertion  Gastrointestinal ROS: no abdominal pain, change in bowel habits, or black or bloody stools  Genito-Urinary ROS: no dysuria, trouble voiding, or hematuria  Musculoskeletal ROS: Left wrist and finger pain and stiffness  Neurological ROS: no TIA or stroke symptoms        Objective:  Physical Examination: General appearance - alert, well appearing, and in no distress, oriented to person, place, and time and overweight  Mental status - alert, oriented to person, place, and time, normal mood, behavior, speech, dress, motor activity, and thought processes  Left wrist   Skin intact surgical incision

## 2018-10-04 ENCOUNTER — TELEPHONE (OUTPATIENT)
Dept: ORTHOPEDIC SURGERY | Age: 58
End: 2018-10-04

## 2018-10-04 DIAGNOSIS — S52.532D CLOSED COLLES' FRACTURE OF LEFT RADIUS WITH ROUTINE HEALING: Primary | ICD-10-CM

## 2018-10-04 NOTE — TELEPHONE ENCOUNTER
Kasie from [address] [place] [address] called in yesterday from #[phone] asking for PT script dated 10-3-18 to be changed to OT instead due to dx of: Closed Colles' fracture of left radius. Fax new OT script over to #[phone] or call them to let them know it was ordered in [address] They will call the patient to schedule therapy visits once script is changed.

## 2018-10-09 ENCOUNTER — APPOINTMENT (OUTPATIENT)
Dept: OCCUPATIONAL THERAPY | Age: 58
End: 2018-10-09
Payer: COMMERCIAL

## 2018-10-16 ENCOUNTER — HOSPITAL ENCOUNTER (OUTPATIENT)
Dept: OCCUPATIONAL THERAPY | Age: 58
Setting detail: THERAPIES SERIES
Discharge: HOME | End: 2018-10-16
Payer: COMMERCIAL

## 2018-10-16 PROCEDURE — 97165 OT EVAL LOW COMPLEX 30 MIN: CPT | Performed by: OCCUPATIONAL THERAPIST

## 2018-10-17 ENCOUNTER — HOSPITAL ENCOUNTER (OUTPATIENT)
Dept: OCCUPATIONAL THERAPY | Age: 58
Setting detail: THERAPIES SERIES
Discharge: HOME | End: 2018-10-17
Payer: COMMERCIAL

## 2018-10-17 PROCEDURE — W0710 HC OT WORK COND PROG PER 15 MIN: HCPCS

## 2018-10-17 NOTE — PROGRESS NOTES
Occupational Therapy    OCCUPATIONAL THERAPY PROGRESS NOTE    Date:  10/17/2018 Initial Evaluation Date: 10/16/2018     Patient Name:  Kinga Ferguson                 :  1960     Restrictions/Precautions: low fall risk  Diagnosis:  S52.532D (ICD-10-CM) - Closed Colles' fracture of left radius with routine healing                                                             Insurance/Certification information:  [facility] (approved until 2018 for work conditioning)   Referring Physician:  Breonna Diego PA-C  Date of Surgery/Injury: 1/3/2018 initial injury, 2018 date of surgery  Plan of care signed (Y/N):  No  Visit# / total visits:  (approved through 2018 for work cond prog- 2 hrs to start ^ing to 4 hrs      Pain Level: 4-5 on scale of 1-10, aching and tight (pulling)    Subjective: Pt states, \" I'm here to strengthen up before I go back to work. \"     Objective:  Updated POC to be completed by 10th visit. INTERVENTION: COMPLETED: SPECIFICS/COMMENTS:   Modality:               AROM:               AAROM:               PROM/Stretching:               Scar Mass/Edema Control:               Strengthening:     UBE 20 mins 3.0 res., bi/unilateral, front/reverse   BTE  4 work sim tasks completed Steering wheel, L direction followed by R, ladder pull, gripper   Red Therabar 10 mins Wrings, up bends, down bends, isometric straight arm shakes in 3 planes- shoulder flex/abd & ext x 1 minute each   5# Dumbbell X 30 reps all Bicep curl, OH Press, OH Tricep Extension, Row, Forearm Rotation @ side 90*   4# Dumbbell X 25 reps all Wrist Flex/Ext/UD/RD/Clockwise/Counter clockwise   5# Yellow putty Tub X 30 reps WB thru wrist flat hand onto tabletop with 5 second holds             Other:                 Assessment/Comments: Pt is making Good progress toward stated plan of care. Tolerated initiated work conditioning strengthening program x 120 minutes well.  Pt declines ice end of session reporting his arm was fatigued vs painful.    -Rehab Potential: Good  -Requires OT Follow Up: Yes  Time In:1:00 PM            Time Out: 3:00 PM             Visit #: 2    Treatment Charges: Mins Units   Modalities     Ther Exercise     Manual Therapy     Thera Activities     ADL/Home Mgt      Neuro Re-education     Gait Training     Group Therapy     Non-Billable Service Time     Other- Work Cond Prog 120  8   Total Time/Units 120 8       -Response to Treatment: Good  Goals: Goals for pt can be see on initial eval occurring on 10/16/18    Plan:   [x]  Continues Plan of care: Treatment covered based on POC and graduated to patient's progress. Pt education continues at each visit to obtain maximum benefits from skilled OT intervention.   []  Alter Plan of care:   []  Discharge:      Waynetta Bamberger COTA/CRISTIANA, [id number]

## 2018-10-18 ENCOUNTER — HOSPITAL ENCOUNTER (OUTPATIENT)
Dept: OCCUPATIONAL THERAPY | Age: 58
Setting detail: THERAPIES SERIES
Discharge: HOME | End: 2018-10-18
Payer: COMMERCIAL

## 2018-10-18 PROCEDURE — W0710 HC OT WORK COND PROG PER 15 MIN: HCPCS

## 2018-10-19 ENCOUNTER — HOSPITAL ENCOUNTER (OUTPATIENT)
Dept: OCCUPATIONAL THERAPY | Age: 58
Setting detail: THERAPIES SERIES
Discharge: HOME | End: 2018-10-19
Payer: COMMERCIAL

## 2018-10-19 PROCEDURE — W0710 HC OT WORK COND PROG PER 15 MIN: HCPCS

## 2018-10-22 ENCOUNTER — HOSPITAL ENCOUNTER (OUTPATIENT)
Dept: OCCUPATIONAL THERAPY | Age: 58
Setting detail: THERAPIES SERIES
Discharge: HOME | End: 2018-10-22
Payer: COMMERCIAL

## 2018-10-22 PROCEDURE — W0710 HC OT WORK COND PROG PER 15 MIN: HCPCS

## 2018-10-23 ENCOUNTER — HOSPITAL ENCOUNTER (OUTPATIENT)
Dept: OCCUPATIONAL THERAPY | Age: 58
Setting detail: THERAPIES SERIES
Discharge: HOME | End: 2018-10-23
Payer: COMMERCIAL

## 2018-10-23 PROCEDURE — W0710 HC OT WORK COND PROG PER 15 MIN: HCPCS

## 2018-10-23 NOTE — PROGRESS NOTES
toss x 30 reps with each #   Black Hand gripper 3 x 10 reps both levels Levels 4 & 5   Blue & Black pinch clips tasks X 10 mins Sawyer, 3 pt & tip pinches   Silver Swiss Ball X 10 mins Flexion floor to ceiling, chest press and wall push ups   Other:                 Assessment/Comments: Pt is making Good progress toward stated plan of care. Tolerated program  PRE's  x ^d 135 minutes well. Pt conts to report no pain end of session.    -Rehab Potential: Good  -Requires OT Follow Up: Yes  Time In:8:45 AM            Time Out: 11:00 AM             Visit #: 5    Treatment Charges: Mins Units   Modalities     Ther Exercise     Manual Therapy     Thera Activities     ADL/Home Mgt      Neuro Re-education     Gait Training     Group Therapy     Non-Billable Service Time     Other- Work Cond Prog 135  9   Total Time/Units 135 9       -Response to Treatment: Good. Pt is hoping to work some shoulder stiffness out also. Highly motivated. Goals: Goals for pt can be see on initial eval occurring on 10/16/18    Plan:   [x]  Continues Plan of care: Treatment covered based on POC and graduated to patient's progress. Pt education continues at each visit to obtain maximum benefits from skilled OT intervention.   []  Alter Plan of care:   []  Discharge:      Maris VYAS/CRISTIANA, [id number]

## 2018-10-24 ENCOUNTER — HOSPITAL ENCOUNTER (OUTPATIENT)
Dept: OCCUPATIONAL THERAPY | Age: 58
Setting detail: THERAPIES SERIES
Discharge: HOME | End: 2018-10-24
Payer: COMMERCIAL

## 2018-10-24 PROCEDURE — W0710 HC OT WORK COND PROG PER 15 MIN: HCPCS

## 2018-10-25 ENCOUNTER — HOSPITAL ENCOUNTER (OUTPATIENT)
Dept: OCCUPATIONAL THERAPY | Age: 58
Setting detail: THERAPIES SERIES
Discharge: HOME | End: 2018-10-25
Payer: COMMERCIAL

## 2018-10-25 PROCEDURE — W0710 HC OT WORK COND PROG PER 15 MIN: HCPCS

## 2018-10-25 NOTE — PROGRESS NOTES
Occupational Therapy    OCCUPATIONAL THERAPY PROGRESS NOTE    Date:  10/25/18 Initial Evaluation Date: 10/16/2018     Patient Name:  Kia Haddad                 :  1960     Restrictions/Precautions: low fall risk  Diagnosis:  S52.532D (ICD-10-CM) - Closed Colles' fracture of left radius with routine healing                                                             Insurance/Certification information:  [facility] (approved until 2018 for work conditioning)   Referring Physician:  Lee Grover PA-C  Date of Surgery/Injury: 1/3/2018 initial injury, 2018 date of surgery  Plan of care signed (Y/N):  No  Visit# / total visits:  (approved through 2018 for work cond prog- 2 hrs to start [place] to 4 hrs      Pain Level: 4-5 on scale of 1-10, aching and tight (pulling)    Subjective: Pt states, \" My fingers are tight first thing in the morning. \"     Objective:  Updated POC to be completed by 10th visit.     INTERVENTION: COMPLETED: SPECIFICS/COMMENTS:   Modality:               AROM:               AAROM:               PROM/Stretching:               Scar Mass/Edema Control:               Strengthening:     UBE 20 mins ^d 4.0 res., bi/unilateral, front/reverse   BTE  x Steering wheel, L direction followed by R, ladder pull, gripper, large lid turn and large screwdriver    Green Therabar 10 mins Wrings, up bends, down bends, isometric straight arm shakes in 3 planes- shoulder flex/abd & ext x 1 minute each   5# Dumbbell X 30 reps all Bicep curl, OH Press, OH Tricep Extension, Row, Forearm Rotation @ side 90*   4# Dumbbell X 25 reps all Wrist Flex/Ext/UD/RD/Clockwise/Counter clockwise   5# orange putty Tub  WB thru wrist flat hand onto tabletop with 5 second holds, rolling, grasping, pinching   CYBEX 30- 50 reps all Chest Press 20#, High/Mid & Low Row 25#, IR/ER 15#, Bicep Curl 20#, Tricep Ext 15#   Bounce Back   Tramp toss, hand to hand toss, unilateral toss x 30 reps with each #   Black Hand

## 2018-10-26 ENCOUNTER — HOSPITAL ENCOUNTER (OUTPATIENT)
Dept: OCCUPATIONAL THERAPY | Age: 58
Setting detail: THERAPIES SERIES
Discharge: HOME | End: 2018-10-26
Payer: COMMERCIAL

## 2018-10-26 PROCEDURE — W0710 HC OT WORK COND PROG PER 15 MIN: HCPCS | Performed by: OCCUPATIONAL THERAPIST

## 2018-10-26 NOTE — PROGRESS NOTES
toss x 30 reps with each #, red and yellow balls   Black Hand gripper X- 3 x 10 reps both levels Levels 4 & 5   Blue & Black pinch clips tasks  Key, 3 pt & tip pinches   Silver Swiss Ball  10 mins Flexion floor to ceiling, chest press and wall push ups   Other:                 Assessment/Comments: Pt is making Good progress toward stated plan of care. Tolerated program  PRE's  x 135 minutes very well. Pt conts to report no pain end of session.     -Rehab Potential: Good  -Requires OT Follow Up: Yes  Time In:9:00 AM            Time Out: 11:15 AM             Visit #: 9    Treatment Charges: Mins Units   Modalities     Ther Exercise     Manual Therapy     Thera Activities     ADL/Home Mgt      Neuro Re-education     Gait Training     Group Therapy     Non-Billable Service Time     Other- Work Cond Prog 135  9   Total Time/Units 135 9       -Response to Treatment: Good. Highly motivated to regain strength and full function of hand for RTW. Goals: Goals for pt can be see on initial eval occurring on 10/16/18    Plan:   [x]  Continues Plan of care: Treatment covered based on POC and graduated to patient's progress. Pt education continues at each visit to obtain maximum benefits from skilled OT intervention.   []  Alter Plan of care:   []  Discharge:      Mitch Barrientos OT/L, CHT   824

## 2018-10-29 ENCOUNTER — HOSPITAL ENCOUNTER (OUTPATIENT)
Dept: OCCUPATIONAL THERAPY | Age: 58
Setting detail: THERAPIES SERIES
Discharge: HOME | End: 2018-10-29
Payer: COMMERCIAL

## 2018-10-29 PROCEDURE — W0710 HC OT WORK COND PROG PER 15 MIN: HCPCS

## 2018-10-30 ENCOUNTER — HOSPITAL ENCOUNTER (OUTPATIENT)
Dept: OCCUPATIONAL THERAPY | Age: 58
Setting detail: THERAPIES SERIES
Discharge: HOME | End: 2018-10-30
Payer: COMMERCIAL

## 2018-10-30 PROCEDURE — W0710 HC OT WORK COND PROG PER 15 MIN: HCPCS

## 2018-10-31 ENCOUNTER — HOSPITAL ENCOUNTER (OUTPATIENT)
Dept: OCCUPATIONAL THERAPY | Age: 58
Setting detail: THERAPIES SERIES
Discharge: HOME | End: 2018-10-31
Payer: COMMERCIAL

## 2018-10-31 PROCEDURE — W0710 HC OT WORK COND PROG PER 15 MIN: HCPCS

## 2018-10-31 NOTE — PROGRESS NOTES
orange putty Tub X WB thru wrist flat hand onto tabletop with 5 second holds, rolling, grasping, pinching   CYBEX X- 30- 50 reps all Chest Press 20#, High/Mid & Low Row 25#, IR/ER 15#, Bicep Curl 20#, Tricep Ext 20#   Bounce Back 2#, 3#, 4# x Tramp toss, hand to hand toss, unilateral toss x 30 reps with each #, red and yellow balls   Black Hand gripper X- 3 x 20 reps both levels Levels 4 & 5   Blue & Black pinch clips tasks x Key, 3 pt & tip pinches   Silver Swiss Ball  10 mins Flexion floor to ceiling, chest press and wall push ups, w bearing, side to side   Other:                 Assessment/Comments: Pt is making Good progress toward stated plan of care. Tolerated program PRE's x 180 minutes well this date. Pt conts to report no pain beginning or end of session. Pt does report digital stiffness upon arrival to therapy and end of session this date when fatigued. -Rehab Potential: Good  -Requires OT Follow Up: Yes  Time In:9:00 AM            Time Out: 12:00 PM             Visit #: 12    Treatment Charges: Mins Units   Modalities     Ther Exercise     Manual Therapy     Thera Activities     ADL/Home Mgt      Neuro Re-education     Gait Training     Group Therapy     Non-Billable Service Time     Other- Work Cond Prog 180 12   Total Time/Units 180 12       -Response to Treatment: Good. Highly motivated to regain strength and full function of hand for RTW. Goals: Goals for pt can be see on initial eval occurring on 10/16/18    Plan:   [x]  Continues Plan of care: Treatment covered based on POC and graduated to patient's progress. Pt education continues at each visit to obtain maximum benefits from skilled OT intervention.   []  Alter Plan of care:   []  Discharge:      Nik VYAS/CRISTIANA, [id number]

## 2018-11-01 ENCOUNTER — HOSPITAL ENCOUNTER (OUTPATIENT)
Dept: OCCUPATIONAL THERAPY | Age: 58
Setting detail: THERAPIES SERIES
Discharge: HOME | End: 2018-11-01
Payer: COMMERCIAL

## 2018-11-01 PROCEDURE — W0710 HC OT WORK COND PROG PER 15 MIN: HCPCS

## 2018-11-01 NOTE — PROGRESS NOTES
Occupational Therapy    OCCUPATIONAL THERAPY PROGRESS NOTE    Date:  18                                    Initial Evaluation Date: 10/16/2018     Patient Name:  Alvarado Vargas                 :  1960     Restrictions/Precautions: low fall risk  Diagnosis:  S52.532D (ICD-10-CM) - Closed Colles' fracture of left radius with routine healing                                                             Insurance/Certification information:  [facility] (approved until 2018 for work conditioning)   Referring Physician:  Isai Cotton PA-C  Date of Surgery/Injury: 1/3/2018 initial injury, 2018 date of surgery  Plan of care signed (Y/N):  No  Visit# / total visits: 15 / 25 (approved through 2018 for work cond prog- 2 hrs to start ^ing to 4 hrs      Pain Level: 4-5 on scale of 1-10, aching and tight (pulling)    Subjective: Pt states,\" I'm really starting to feel stronger! \"     Objective:  Updated POC to be completed by 10th visit.     INTERVENTION: COMPLETED: SPECIFICS/COMMENTS:   Modality:               AROM:               AAROM:               PROM/Stretching:               Scar Mass/Edema Control:               Strengthening:     UBE X-20 mins  5.0 res., bi/unilateral, front/reverse   BTE   Steering wheel, L direction followed by R, ladder pull, gripper, large lid turn and large screwdriver for sup/pron and added wrist flex/ext same tool   Cardio glider/stepper ^d 25 mins 3 resistance using arm handles    Green Therabar &  Blue Therabar X- 15 mins Wrings, up bends, down bends, isometric straight arm shakes in 3 planes- shoulder flex/abd & ext x 1 minute each   5# & 6# Dumbbell X 50 reps all Bicep curl (with twist also), OH Press, OH Tricep Extension, Row, Serving, Forearm Rotation @ side 90*   4# & 5# Dumbbell X 50 reps all Wrist Flex/Ext/UD/RD/Clockwise/Counter clockwise   5# Dowel Kee ex's X 20 mins Gross strengthening   5# orange putty Tub X WB thru wrist flat hand onto tabletop with 5 second holds, rolling, grasping, pinching   CYBEX X- 30- 50 reps all Chest Press 20#, High/Mid & Low Row 25#, IR/ER 15#, Bicep Curl 20#, Tricep Ext 20#   Bounce Back 2#, 3#, 4# x Tramp toss, hand to hand toss, unilateral toss x 30 reps with each #, red and yellow balls   Black Hand gripper X- 3 x 20 reps both levels Levels 4 & 5   Blue & Black pinch clips tasks x Key, 3 pt & tip pinches   Silver Swiss Ball  10 mins Flexion floor to ceiling, chest press and wall push ups, w bearing, side to side   Other:                 Assessment/Comments: Pt is making Good progress toward stated plan of care. Tolerated program PRE's x 180 minutes well this date.     -Rehab Potential: Good  -Requires OT Follow Up: Yes  Time In:10:30 AM            Time Out: 1:30 PM             Visit #: 12    Treatment Charges: Mins Units   Modalities     Ther Exercise     Manual Therapy     Thera Activities     ADL/Home Mgt      Neuro Re-education     Gait Training     Group Therapy     Non-Billable Service Time     Other- Work Cond Prog 180 12   Total Time/Units 180 12       -Response to Treatment: Good. Highly motivated to regain strength and full function of hand for RTW. Goals: Goals for pt can be see on initial eval occurring on 10/16/18    Plan:   [x]  Continues Plan of care: Treatment covered based on POC and graduated to patient's progress. Pt education continues at each visit to obtain maximum benefits from skilled OT intervention.   []  Alter Plan of care:   []  Discharge:      [place] VYAS/L, [id number]

## 2018-11-02 ENCOUNTER — HOSPITAL ENCOUNTER (OUTPATIENT)
Dept: OCCUPATIONAL THERAPY | Age: 58
Setting detail: THERAPIES SERIES
Discharge: HOME | End: 2018-11-02
Payer: COMMERCIAL

## 2018-11-02 PROCEDURE — W0710 HC OT WORK COND PROG PER 15 MIN: HCPCS | Performed by: OCCUPATIONAL THERAPIST

## 2018-11-02 NOTE — PROGRESS NOTES
Occupational Therapy    OCCUPATIONAL THERAPY PROGRESS NOTE    Date:  18                                    Initial Evaluation Date: 10/16/2018     Patient Name:  Ángel Cyr                 :  1960     Restrictions/Precautions: low fall risk  Diagnosis:  S52.532D (ICD-10-CM) - Closed Colles' fracture of left radius with routine healing                                                             Insurance/Certification information:  [facility] (approved until 2018 for work conditioning)   Referring Physician:  Nicole German PA-C  Date of Surgery/Injury: 1/3/2018 initial injury, 2018 date of surgery  Plan of care signed (Y/N):  No  Visit# / total visits: 15 / 25 (approved through 2018 for work cond prog- 2 hrs to start ^ing to 4 hrs      Pain Level: 4-5 on scale of 1-10, aching and tight (pulling)    Subjective: Pt states,\"It feels good to do that cardio exercise also - my endurance is getting better. \"     Objective:  Updated POC to be completed by 18 visit.     INTERVENTION: COMPLETED: SPECIFICS/COMMENTS:   Modality:               AROM:               AAROM:               PROM/Stretching:               Scar Mass/Edema Control:               Strengthening:     UBE X-20 mins  5.0 res., bi/unilateral, front/reverse   BTE  X - 5 ercercises Steering wheel, L direction followed by R, L gripper, large lid turn fro RD-UD and large screwdriver for  wrist flex/ext   Cardio glider/stepper X 20 mins 3 to 5  resistance using arm handles    Green Therabar &  Blue Therabar X- 15 mins Wrings, up bends, down bends, isometric straight arm shakes in 3 planes- shoulder flex/abd & ext x 1 minute each   5# & 6# Dumbbell X 50 reps all Bicep curl (with twist also), OH Press, OH Tricep Extension, Row, Serving, Forearm Rotation @ side 90*   4# & 5# Dumbbell X 50 reps all Wrist Flex/Ext/UD/RD/Clockwise/Counter clockwise   5# Dowel Kee ex's X 20 mins Gross strengthening   5# orange putty Tub  WB thru wrist flat hand onto tabletop with 5 second holds, rolling, grasping, pinching   CYBEX X- 30- 50 reps all Chest Press 20#, High/Mid & Low Row 25#, IR/ER 20#, Bicep Curl 20#, Tricep Ext 20#   Bounce Back 2#, 3#, 4# x Tramp toss, hand to hand toss, unilateral toss x 30 reps with each #, red and yellow balls   Black Hand gripper X- 3 x 20 reps both levels Levels 4 & 5   Blue & Black pinch clips tasks  Key, 3 pt & tip pinches   Silver Swiss Ball  10 mins Flexion floor to ceiling, chest press and wall push ups, w bearing, side to side   Other:                 Assessment/Comments: Pt is making Good progress toward stated plan of care. Tolerated program PRE's x 180 minutes well this date. Pt did get a cramp in her R upper arm today during the BTE program - therapist did some massage to the muscle. He states this does happen on occasion.     -Rehab Potential: Good  -Requires OT Follow Up: Yes  Time In:  9:00 AM            Time Out: 12:00 pm             Visit #: 13    Treatment Charges: Mins Units   Modalities     Ther Exercise     Manual Therapy     Thera Activities     ADL/Home Mgt      Neuro Re-education     Gait Training     Group Therapy     Non-Billable Service Time     Other- Work Cond Prog 180 12   Total Time/Units 180 12       -Response to Treatment: Good. Highly motivated to regain strength and full function of hand for RTW. Goals: Goals for pt can be see on initial eval occurring on 10/16/18    Plan:   [x]  Continues Plan of care: Treatment covered based on POC and graduated to patient's progress. Pt education continues at each visit to obtain maximum benefits from skilled OT intervention.   []  Alter Plan of care:   []  Discharge:      Seng Hoskins OT/L, CHT  [phone]

## 2018-11-05 ENCOUNTER — HOSPITAL ENCOUNTER (OUTPATIENT)
Dept: OCCUPATIONAL THERAPY | Age: 58
Setting detail: THERAPIES SERIES
Discharge: HOME | End: 2018-11-05
Payer: COMMERCIAL

## 2018-11-05 PROCEDURE — W0710 HC OT WORK COND PROG PER 15 MIN: HCPCS

## 2018-11-06 ENCOUNTER — HOSPITAL ENCOUNTER (OUTPATIENT)
Dept: OCCUPATIONAL THERAPY | Age: 58
Setting detail: THERAPIES SERIES
Discharge: HOME | End: 2018-11-06
Payer: COMMERCIAL

## 2018-11-06 PROCEDURE — W0710 HC OT WORK COND PROG PER 15 MIN: HCPCS

## 2018-11-07 ENCOUNTER — HOSPITAL ENCOUNTER (OUTPATIENT)
Dept: OCCUPATIONAL THERAPY | Age: 58
Setting detail: THERAPIES SERIES
Discharge: HOME | End: 2018-11-07
Payer: COMMERCIAL

## 2018-11-07 ENCOUNTER — OFFICE VISIT (OUTPATIENT)
Dept: ORTHOPEDIC SURGERY | Age: 58
End: 2018-11-07
Payer: COMMERCIAL

## 2018-11-07 VITALS
HEART RATE: 81 BPM | WEIGHT: 185 LBS | BODY MASS INDEX: 25.9 KG/M2 | HEIGHT: 71 IN | DIASTOLIC BLOOD PRESSURE: 86 MMHG | RESPIRATION RATE: 18 BRPM | SYSTOLIC BLOOD PRESSURE: 138 MMHG

## 2018-11-07 DIAGNOSIS — S52.532D CLOSED COLLES' FRACTURE OF LEFT RADIUS WITH ROUTINE HEALING: Primary | ICD-10-CM

## 2018-11-07 PROCEDURE — 99212 OFFICE O/P EST SF 10 MIN: CPT

## 2018-11-07 PROCEDURE — G0463 HOSPITAL OUTPT CLINIC VISIT: HCPCS

## 2018-11-07 PROCEDURE — W0710 HC OT WORK COND PROG PER 15 MIN: HCPCS

## 2018-11-07 PROCEDURE — 99212 OFFICE O/P EST SF 10 MIN: CPT | Performed by: PHYSICIAN ASSISTANT

## 2018-11-08 ENCOUNTER — HOSPITAL ENCOUNTER (OUTPATIENT)
Dept: OCCUPATIONAL THERAPY | Age: 58
Setting detail: THERAPIES SERIES
Discharge: HOME | End: 2018-11-08
Payer: COMMERCIAL

## 2018-11-08 PROCEDURE — W0710 HC OT WORK COND PROG PER 15 MIN: HCPCS

## 2018-11-08 NOTE — PROGRESS NOTES
Occupational Therapy    OCCUPATIONAL THERAPY PROGRESS NOTE    Date:  18                                    Initial Evaluation Date: 10/16/2018     Patient Name:  Tracie Myers                 :  1960     Restrictions/Precautions: low fall risk  Diagnosis:  S52.532D (ICD-10-CM) - Closed Colles' fracture of left radius with routine healing                                                             Insurance/Certification information:  [facility] (approved until 2018 for work conditioning)   Referring Physician:  Noreen Tineo PA-C  Date of Surgery/Injury: 1/3/2018 initial injury, 2018 date of surgery  Plan of care signed (Y/N):  No  Visit# / total visits:  (approved through 2018 for work cond prog- 2 hrs to start ^ing to 4 hrs      Pain Level: 3 on scale of 1-10, aching and tight (pulling)    Subjective: Pt states, \" Tomorrow is my last visit. I start back to work next week. \"     Objective:  Updated POC to be completed by 18 visit. INTERVENTION: COMPLETED: SPECIFICS/COMMENTS:   Modality:               AROM:               AAROM:               PROM/Stretching:               Scar Mass/Edema Control:               Strengthening:     UBE X-20 mins  5.0 res., bi/unilateral, front/reverse   BTE   Steering wheel, L direction followed by R, L hand gripper, ladder, large lid turn for RD-UD and large screwdriver for  wrist flex/ext and sup/pron. ....  Added pinch tool   Cardio glider/stepper X ^d 25 mins 3 to 5  resistance using arm handles    Green Therabar &  Blue Therabar X- 15 mins Wrings, up bends, down bends, isometric straight arm shakes in 3 planes- shoulder flex/abd & ext x 1 minute each    6# & ^d 7# Dumbbell X 50 reps all Bicep curl (with twist also), OH Press, OH Tricep Extension, Row, Serving, Forearm Rotation @ side 90*   4# & 5# Dumbbell X 50 reps all Wrist Flex/Ext/UD/RD/Clockwise/Counter clockwise   ^d 7# Dowel Kee ex's X 20 mins Gross strengthening   Cylindrical

## 2018-11-09 ENCOUNTER — HOSPITAL ENCOUNTER (OUTPATIENT)
Dept: OCCUPATIONAL THERAPY | Age: 58
Setting detail: THERAPIES SERIES
Discharge: HOME | End: 2018-11-09
Payer: COMMERCIAL

## 2018-11-09 PROCEDURE — W0710 HC OT WORK COND PROG PER 15 MIN: HCPCS | Performed by: OCCUPATIONAL THERAPIST

## 2018-11-15 ENCOUNTER — HOSPITAL ENCOUNTER (OUTPATIENT)
Dept: OCCUPATIONAL THERAPY | Age: 58
Setting detail: THERAPIES SERIES
End: 2018-11-15
Payer: COMMERCIAL

## 2020-08-26 ENCOUNTER — HOSPITAL ENCOUNTER (EMERGENCY)
Age: 60
Discharge: HOME | End: 2020-08-27

## 2020-08-26 ENCOUNTER — APPOINTMENT (OUTPATIENT)
Dept: CT IMAGING | Age: 60
End: 2020-08-26

## 2020-08-26 LAB
ANION GAP SERPL CALCULATED.3IONS-SCNC: 13 MMOL/L (ref 7–16)
BASOPHILS ABSOLUTE: 0.03 E9/L (ref 0–0.2)
BASOPHILS RELATIVE PERCENT: 0.3 % (ref 0–2)
BUN BLDV-MCNC: 14 MG/DL (ref 8–23)
CALCIUM SERPL-MCNC: 9.9 MG/DL (ref 8.6–10.2)
CHLORIDE BLD-SCNC: 96 MMOL/L (ref 98–107)
CO2: 30 MMOL/L (ref 22–29)
CREAT SERPL-MCNC: 0.9 MG/DL (ref 0.7–1.2)
EOSINOPHILS ABSOLUTE: 0.04 E9/L (ref 0.05–0.5)
EOSINOPHILS RELATIVE PERCENT: 0.4 % (ref 0–6)
GFR AFRICAN AMERICAN: >60
GFR NON-AFRICAN AMERICAN: >60 ML/MIN/1.73
GLUCOSE BLD-MCNC: 193 MG/DL (ref 74–99)
HCT VFR BLD CALC: 44.2 % (ref 37–54)
HEMOGLOBIN: 14.3 G/DL (ref 12.5–16.5)
IMMATURE GRANULOCYTES #: 0.04 E9/L
IMMATURE GRANULOCYTES %: 0.4 % (ref 0–5)
LYMPHOCYTES ABSOLUTE: 1.33 E9/L (ref 1.5–4)
LYMPHOCYTES RELATIVE PERCENT: 12.4 % (ref 20–42)
MCH RBC QN AUTO: 29.7 PG (ref 26–35)
MCHC RBC AUTO-ENTMCNC: 32.4 % (ref 32–34.5)
MCV RBC AUTO: 91.7 FL (ref 80–99.9)
MONOCYTES ABSOLUTE: 1.19 E9/L (ref 0.1–0.95)
MONOCYTES RELATIVE PERCENT: 11.1 % (ref 2–12)
NEUTROPHILS ABSOLUTE: 8.09 E9/L (ref 1.8–7.3)
NEUTROPHILS RELATIVE PERCENT: 75.4 % (ref 43–80)
PDW BLD-RTO: 12.6 FL (ref 11.5–15)
PLATELET # BLD: 232 E9/L (ref 130–450)
PMV BLD AUTO: 9.3 FL (ref 7–12)
POTASSIUM SERPL-SCNC: 4.6 MMOL/L (ref 3.5–5)
RBC # BLD: 4.82 E12/L (ref 3.8–5.8)
SODIUM BLD-SCNC: 139 MMOL/L (ref 132–146)
WBC # BLD: 10.7 E9/L (ref 4.5–11.5)

## 2020-08-26 PROCEDURE — 96375 TX/PRO/DX INJ NEW DRUG ADDON: CPT

## 2020-08-26 PROCEDURE — 96365 THER/PROPH/DIAG IV INF INIT: CPT

## 2020-08-26 PROCEDURE — 96366 THER/PROPH/DIAG IV INF ADDON: CPT

## 2020-08-26 PROCEDURE — 85025 COMPLETE CBC W/AUTO DIFF WBC: CPT

## 2020-08-26 PROCEDURE — 2500000003 HC RX 250 WO HCPCS: Performed by: PHYSICIAN ASSISTANT

## 2020-08-26 PROCEDURE — 2580000003 HC RX 258: Performed by: PHYSICIAN ASSISTANT

## 2020-08-26 PROCEDURE — 6360000002 HC RX W HCPCS: Performed by: PHYSICIAN ASSISTANT

## 2020-08-26 PROCEDURE — 87040 BLOOD CULTURE FOR BACTERIA: CPT

## 2020-08-26 PROCEDURE — 70491 CT SOFT TISSUE NECK W/DYE: CPT

## 2020-08-26 PROCEDURE — 10060 I&D ABSCESS SIMPLE/SINGLE: CPT

## 2020-08-26 PROCEDURE — 80048 BASIC METABOLIC PNL TOTAL CA: CPT

## 2020-08-26 PROCEDURE — 6360000004 HC RX CONTRAST MEDICATION: Performed by: RADIOLOGY

## 2020-08-26 PROCEDURE — 99283 EMERGENCY DEPT VISIT LOW MDM: CPT

## 2020-08-26 PROCEDURE — 99282 EMERGENCY DEPT VISIT SF MDM: CPT

## 2020-08-26 RX ORDER — METHYLPREDNISOLONE SODIUM SUCCINATE 125 MG/2ML
80 INJECTION, POWDER, LYOPHILIZED, FOR SOLUTION INTRAMUSCULAR; INTRAVENOUS ONCE
Status: COMPLETED | OUTPATIENT
Start: 2020-08-26 | End: 2020-08-26

## 2020-08-26 RX ORDER — LIDOCAINE HYDROCHLORIDE AND EPINEPHRINE 10; 10 MG/ML; UG/ML
20 INJECTION, SOLUTION INFILTRATION; PERINEURAL ONCE
Status: DISCONTINUED | OUTPATIENT
Start: 2020-08-27 | End: 2020-08-27 | Stop reason: HOSPADM

## 2020-08-26 RX ORDER — CLINDAMYCIN PHOSPHATE 900 MG/50ML
900 INJECTION INTRAVENOUS ONCE
Status: COMPLETED | OUTPATIENT
Start: 2020-08-26 | End: 2020-08-27

## 2020-08-26 RX ORDER — 0.9 % SODIUM CHLORIDE 0.9 %
500 INTRAVENOUS SOLUTION INTRAVENOUS ONCE
Status: COMPLETED | OUTPATIENT
Start: 2020-08-26 | End: 2020-08-27

## 2020-08-26 RX ADMIN — IOPAMIDOL 90 ML: 755 INJECTION, SOLUTION INTRAVENOUS at 22:14

## 2020-08-26 RX ADMIN — SODIUM CHLORIDE 500 ML: 9 INJECTION, SOLUTION INTRAVENOUS at 21:50

## 2020-08-26 RX ADMIN — METHYLPREDNISOLONE SODIUM SUCCINATE 80 MG: 125 INJECTION, POWDER, FOR SOLUTION INTRAMUSCULAR; INTRAVENOUS at 21:50

## 2020-08-26 RX ADMIN — CLINDAMYCIN PHOSPHATE 900 MG: 900 INJECTION, SOLUTION INTRAVENOUS at 21:50

## 2020-08-26 ASSESSMENT — PAIN SCALES - GENERAL: PAINLEVEL_OUTOF10: 9

## 2020-08-26 ASSESSMENT — PAIN DESCRIPTION - PAIN TYPE: TYPE: ACUTE PAIN

## 2020-08-26 ASSESSMENT — PAIN DESCRIPTION - LOCATION: LOCATION: THROAT

## 2020-08-27 VITALS
HEIGHT: 71 IN | HEART RATE: 94 BPM | RESPIRATION RATE: 18 BRPM | TEMPERATURE: 98.6 F | DIASTOLIC BLOOD PRESSURE: 85 MMHG | OXYGEN SATURATION: 94 % | WEIGHT: 180 LBS | SYSTOLIC BLOOD PRESSURE: 144 MMHG | BODY MASS INDEX: 25.2 KG/M2

## 2020-08-27 PROCEDURE — 99284 EMERGENCY DEPT VISIT MOD MDM: CPT | Performed by: OTOLARYNGOLOGY

## 2020-08-27 RX ORDER — PREDNISONE 20 MG/1
20 TABLET ORAL DAILY
Qty: 4 TABLET | Refills: 0 | Status: SHIPPED | OUTPATIENT
Start: 2020-08-27 | End: 2020-08-31

## 2020-08-27 RX ORDER — CLINDAMYCIN HYDROCHLORIDE 300 MG/1
300 CAPSULE ORAL 3 TIMES DAILY
Qty: 30 CAPSULE | Refills: 0 | Status: SHIPPED | OUTPATIENT
Start: 2020-08-27 | End: 2020-09-06

## 2020-08-27 RX ORDER — HYDROCODONE BITARTRATE AND ACETAMINOPHEN 5; 325 MG/1; MG/1
1 TABLET ORAL EVERY 6 HOURS PRN
Qty: 5 TABLET | Refills: 0 | Status: SHIPPED | OUTPATIENT
Start: 2020-08-27 | End: 2020-08-29

## 2020-08-27 RX ORDER — NAPROXEN 500 MG/1
500 TABLET ORAL 2 TIMES DAILY
Qty: 14 TABLET | Refills: 0 | Status: SHIPPED | OUTPATIENT
Start: 2020-08-27 | End: 2020-09-03

## 2020-08-27 ASSESSMENT — ENCOUNTER SYMPTOMS
COLOR CHANGE: 0
VOMITING: 0
COUGH: 0
SHORTNESS OF BREATH: 0
EYE DISCHARGE: 0
EYE REDNESS: 0
NAUSEA: 0
TROUBLE SWALLOWING: 1
VOICE CHANGE: 0
SORE THROAT: 1
ALLERGIC/IMMUNOLOGIC NEGATIVE: 1
STRIDOR: 0

## 2020-08-27 NOTE — ED PROVIDER NOTES
ED Attending  CC: Emma     Department of Emergency Medicine   ED  Provider Note  Admit Date/RoomTime: 8/26/2020  8:14 PM  ED Room: 06/06    Chief Complaint   Pharyngitis (amoxicillin x 3 days with no improvement. Seen at [facility] today and concern for peritonsilar abscess on right.  )    History of Present Illness   Source of history provided by:  patient and spouse/SO. History/Exam Limitations: none. Octaviano Zhao is a 61 y.o. old male who has a past medical history of:   Past Medical History:   Diagnosis Date    Diabetes mellitus ([address]     Hyperlipidemia     Hypertension     presents to the emergency department by private vehicle, for right sided sore throat pain, which occured 3 day(s) prior to arrival. Associated Signs & Symptoms: none Since onset the symptoms have been worsening. He is drinking moderate amounts of fluids. Patient called his primary care doctor and he is on vacation but they did call him in some oxacillin and he has been on that for 3 days. He reports it is only getting worse. Denies fevers, cough. He reports he is having pain in his right ear          Exposed To: Streptococcus: unknown. Infectious Mononucleosis:  unknown. Symptoms:  Pain:  Yes. Muffled Voice:  Yes. Hoarse:  No.                            Difficulty with Secretions:  Yes. ROS    Pertinent positives and negatives are stated within HPI, all other systems reviewed and are negative. Past Surgical History:  has a past surgical history that includes Foot surgery (Right); Colonoscopy; and Wrist surgery (Left, 01/18/2018). Social History:  reports that he quit smoking about 40 years ago. His smoking use included cigarettes. He has quit using smokeless tobacco.  His smokeless tobacco use included chew and snuff. He reports current alcohol use of about 2.0 standard drinks of alcohol per week.  He reports that he does not use Neutrophils Absolute 8.09 (H) 1.80 - 7.30 E9/L    Immature Granulocytes # 0.04 E9/L    Lymphocytes Absolute 1.33 (L) 1.50 - 4.00 E9/L    Monocytes Absolute 1.19 (H) 0.10 - 0.95 E9/L    Eosinophils Absolute 0.04 (L) 0.05 - 0.50 E9/L    Basophils Absolute 0.03 0.00 - 0.20 U4/O   Basic Metabolic Panel   Result Value Ref Range    Sodium 139 132 - 146 mmol/L    Potassium 4.6 3.5 - 5.0 mmol/L    Chloride 96 (L) 98 - 107 mmol/L    CO2 30 (H) 22 - 29 mmol/L    Anion Gap 13 7 - 16 mmol/L    Glucose 193 (H) 74 - 99 mg/dL    BUN 14 8 - 23 mg/dL    CREATININE 0.9 0.7 - 1.2 mg/dL    GFR Non-African American >60 >=60 mL/min/1.73    GFR African American >60     Calcium 9.9 8.6 - 10.2 mg/dL     Imaging: All Radiology results interpreted by Radiologist unless otherwise noted. CT SOFT TISSUE NECK W CONTRAST   Final Result      1. Right peritonsillar abscess. 2. Inflammatory changes extending from level of right palatine tonsil   caudally to level of the glottis. 3. Moderate narrowing of oropharyngeal and hypopharyngeal airway. ALERT:  THIS IS AN ABNORMAL REPORT. ED Course / Medical Decision Making     Medications   butamben-tetracaine-benzocaine (CETACAINE) spray 1 spray (has no administration in time range)   lidocaine-EPINEPHrine 1 percent-1:137485 injection 20 mL (has no administration in time range)   clindamycin (CLEOCIN) 900 mg in dextrose 5 % 50 mL IVPB (0 mg Intravenous Stopped 8/27/20 0010)   methylPREDNISolone sodium (SOLU-MEDROL) injection 80 mg (80 mg Intravenous Given 8/26/20 2150)   0.9 % sodium chloride bolus (0 mLs Intravenous Stopped 8/27/20 0010)   iopamidol (ISOVUE-370) 76 % injection 90 mL (90 mLs Intravenous Given 8/26/20 2214)        Consult(s):   None    Procedure(s):   Dr Wayne Necessary tried bedside aspiration. Unable to obtain pus. ENT resident Dr. Annabelle Dao came in and open the abscess. Refer to his note for full details.   He advised it was okay for patient to go home with prednisone 20 mg for the next 4 days as well as clindamycin 300 3 times daily. I also will provide patient with pain control. MDM:   Patient presents to emergency with sore throat and unilateral swelling with uvular deviation and CT showing peritonsillar abscess. Full drainage of the abscess was not able to be performed in ED due to the positioning however the ENT resident was able to get some drainage and opened up the area. Patient will be given antibiotics and steroids for home. Advised to return to emergency if he has any worsening swelling, difficulty breathing, high fevers, unable to take the medications. Otherwise follow-up in office per ENT instructions. Counseling: The emergency provider has spoken with the patient and spouse and discussed todays results, in addition to providing specific details for the plan of care and counseling regarding the diagnosis and prognosis. Questions are answered at this time and they are agreeable with the plan. Assessment     1. Peritonsillar abscess      Plan   Discharge to home  Patient condition is stable    New Medications     New Prescriptions    CLINDAMYCIN (CLEOCIN) 300 MG CAPSULE    Take 1 capsule by mouth 3 times daily for 10 days    HYDROCODONE-ACETAMINOPHEN (NORCO) 5-325 MG PER TABLET    Take 1 tablet by mouth every 6 hours as needed for Pain for up to 2 days. NAPROXEN (NAPROSYN) 500 MG TABLET    Take 1 tablet by mouth 2 times daily for 7 days    PREDNISONE (DELTASONE) 20 MG TABLET    Take 1 tablet by mouth daily for 4 days     Electronically signed by Nino Chappell PA-C   DD: 8/27/20  **This report was transcribed using voice recognition software. Every effort was made to ensure accuracy; however, inadvertent computerized transcription errors may be present.   END OF ED PROVIDER NOTE       Nino Chappell PA-C  08/27/20 0207

## 2020-08-27 NOTE — CONSULTS
Subjective:      Patient ID:  Chaya Ramires is a 61 y.o. male. HPI:    Pt is here for evaluation of  throat pain. The symptoms have been present for 3 days ago. Symptoms are primarily located on the right, and discomfort is fairly severe. Treatment thus far has included amoxicillin; symptoms have been not changed with treatment. Studies performed thus far include: CT scan of neck    Patient's medications, allergies, past medical, surgical, social and family histories were reviewed and updated as appropriate. Review of Systems   Constitutional: Negative for chills, fatigue and fever. HENT: Positive for sore throat and trouble swallowing. Negative for voice change. Eyes: Negative for discharge and redness. Respiratory: Negative for cough, shortness of breath and stridor. Gastrointestinal: Negative for nausea and vomiting. Endocrine: Negative. Genitourinary: Negative. Musculoskeletal: Negative. Skin: Negative for color change and rash. Allergic/Immunologic: Negative. Neurological: Negative for dizziness, speech difficulty and headaches. Hematological: Negative. Psychiatric/Behavioral: Negative for agitation and confusion. All other systems reviewed and are negative. Objective:    Physical Exam  Constitutional:       Appearance: Normal appearance. He is normal weight. HENT:      Head: Normocephalic and atraumatic. Right Ear: External ear normal.      Left Ear: External ear normal.      Nose: Nose normal.      Mouth/Throat:      Mouth: Mucous membranes are moist.     Eyes:      Pupils: Pupils are equal, round, and reactive to light. Neck:      Musculoskeletal: Normal range of motion. Cardiovascular:      Rate and Rhythm: Normal rate. Pulmonary:      Effort: Pulmonary effort is normal.   Musculoskeletal: Normal range of motion. Skin:     General: Skin is warm and dry. Neurological:      Mental Status: He is alert.              I&D of Abscess:  Pt was anesthetized with Bezocaine in theoral cavity until the pt felt the back of hist throat was numb. 2 cc of 1% 1:100,000 lidocaine with epinephrine was injected into the anterior pillar of the right tonsil A #11 blade scalpel was used to make a small incision in the superior lateral area of the right tonsillar pillar. A hemostat was then used toopen up the insicion and the tip of the hemostat was advance into the tonsillar capsule space. Pt did have some bleeding from the incision and there was purulence extracted. Pt was thenallowed to rinse his mouth with ice water. Pt tolerated procedure well. Assessment:      [unfilled]           Plan:      1. I recommend I&D peritonsillar abscess;  see separate procedure note. .   2. The risks and benefits of my recommendations, as well as other treatment options were discussedwith the patient today.    3. Return for Follow up in 1 week(s)    Electronically signed by Chela Stacy DO on 8/27/2020 at 1:51 AM

## 2020-08-28 ENCOUNTER — TELEPHONE (OUTPATIENT)
Dept: ENT CLINIC | Age: 60
End: 2020-08-28

## 2020-08-28 NOTE — TELEPHONE ENCOUNTER
Patient was seen in the ER on 8/26/2020 for PTA and is scheduled for follow up appointment 9/4/2020. Patient is starting to experience headaches, ear pain, and feels PTA is filling with fluid again. Patient is still taking Augmentin antibiotics but advised patient if problems progress to get worse, go to ED.

## 2020-09-01 LAB
BLOOD CULTURE, ROUTINE: NORMAL
CULTURE, BLOOD 2: NORMAL

## 2020-09-04 ENCOUNTER — OFFICE VISIT (OUTPATIENT)
Dept: ENT CLINIC | Age: 60
End: 2020-09-04

## 2020-09-04 VITALS — WEIGHT: 180 LBS | BODY MASS INDEX: 25.2 KG/M2 | TEMPERATURE: 97.2 F | HEIGHT: 71 IN

## 2020-09-04 PROCEDURE — 99204 OFFICE O/P NEW MOD 45 MIN: CPT | Performed by: OTOLARYNGOLOGY

## 2020-09-04 ASSESSMENT — ENCOUNTER SYMPTOMS
ABDOMINAL PAIN: 0
SHORTNESS OF BREATH: 0
CHEST TIGHTNESS: 0
EYE PAIN: 0
SINUS PAIN: 0
TROUBLE SWALLOWING: 0
SINUS PRESSURE: 0
PHOTOPHOBIA: 0
ABDOMINAL DISTENTION: 0
COLOR CHANGE: 0

## 2020-09-04 NOTE — PROGRESS NOTES
Subjective:     Patient ID:  Helen Martínez is a 61 y.o. male. HPI:    Pt is here for evaluation of  PTA which occurred two weeks ago. He was drained at bedside in the ED. Patient has been dong better. He is taking his abx. Overall, sore throat has subsided. Past Medical History:   Diagnosis Date    Diabetes mellitus ([facility] [address]     Hyperlipidemia     Hypertension      Patient Active Problem List    Diagnosis Date Noted    Peritonsillar abscess     Closed Colles' fracture of left radius with routine healing 2018    Closed fracture of right distal radius 2018    Subluxation of distal radial-ulnar joint, left, initial encounter 2018     Past Surgical History:   Procedure Laterality Date    COLONOSCOPY      FOOT SURGERY Right     plantar fascitis surgery    WRIST SURGERY Left 2018    orif     Family History   Problem Relation Age of Onset    Breast Cancer Mother     Heart Disease Mother     Cancer Father     No Known Problems Sister     No Known Problems Brother     No Known Problems Brother     No Known Problems Brother     No Known Problems Brother      Social History     Socioeconomic History    Marital status: Single     Spouse name: None    Number of children: None    Years of education: None    Highest education level: None   Occupational History    None   Social Needs    Financial resource strain: None    Food insecurity     Worry: None     Inability: None    Transportation needs     Medical: None     Non-medical: None   Tobacco Use    Smoking status: Former Smoker     Types: Cigarettes     Last attempt to quit: 10/24/1979     Years since quittin.8    Smokeless tobacco: Former User     Types: Chew, Snuff   Substance and Sexual Activity    Alcohol use:  Yes     Alcohol/week: 2.0 standard drinks     Types: 2 Cans of beer per week     Comment: 2 cans a week    Drug use: No    Sexual activity: None   Lifestyle    Physical activity     Days per week: None Minutes per session: None    Stress: None   Relationships    Social connections     Talks on phone: None     Gets together: None     Attends Rastafari service: None     Active member of club or organization: None     Attends meetings of clubs or organizations: None     Relationship status: None    Intimate partner violence     Fear of current or ex partner: None     Emotionally abused: None     Physically abused: None     Forced sexual activity: None   Other Topics Concern    None   Social History Narrative    None     Patient's medications, allergies,past medical, surgical, social and family histories were reviewed and updated asappropriate. Review of Systems   Constitutional: Negative for chills and fever. HENT: Negative for congestion, ear discharge, ear pain, sinus pressure, sinus pain, tinnitus and trouble swallowing. Eyes: Negative for photophobia, pain and visual disturbance. Respiratory: Negative for chest tightness and shortness of breath. Cardiovascular: Negative for chest pain and palpitations. Gastrointestinal: Negative for abdominal distention and abdominal pain. Endocrine: Negative for cold intolerance and heat intolerance. Skin: Negative for color change and pallor. Neurological: Negative for dizziness and facial asymmetry. Psychiatric/Behavioral: Negative for behavioral problems and confusion. Objective:   Physical Exam  Constitutional:       Appearance: He is well-developed. HENT:      Head: Normocephalic and atraumatic. Right Ear: Tympanic membrane, ear canal and external ear normal.      Left Ear: Tympanic membrane, ear canal and external ear normal.      Nose: Nose normal.      Mouth/Throat:      Pharynx: Uvula midline. Tonsils: No tonsillar exudate or tonsillar abscesses. 1+ on the right. 1+ on the left. Eyes:      Pupils: Pupils are equal, round, and reactive to light. Neck:      Musculoskeletal: Normal range of motion and neck supple.

## 2020-12-17 ENCOUNTER — APPOINTMENT (OUTPATIENT)
Dept: GENERAL RADIOLOGY | Age: 60
End: 2020-12-17
Payer: MEDICAID

## 2020-12-17 ENCOUNTER — HOSPITAL ENCOUNTER (EMERGENCY)
Age: 60
Discharge: HOME | End: 2020-12-17
Attending: EMERGENCY MEDICINE
Payer: MEDICAID

## 2020-12-17 VITALS
HEART RATE: 91 BPM | OXYGEN SATURATION: 97 % | WEIGHT: 180 LBS | RESPIRATION RATE: 18 BRPM | HEIGHT: 71 IN | TEMPERATURE: 98.3 F | BODY MASS INDEX: 25.2 KG/M2 | DIASTOLIC BLOOD PRESSURE: 81 MMHG | SYSTOLIC BLOOD PRESSURE: 133 MMHG

## 2020-12-17 LAB
ALBUMIN SERPL-MCNC: 4.2 G/DL (ref 3.5–5.2)
ALP BLD-CCNC: 61 U/L (ref 40–129)
ALT SERPL-CCNC: 39 U/L (ref 0–40)
ANION GAP SERPL CALCULATED.3IONS-SCNC: 10 MMOL/L (ref 7–16)
APTT: 36 SEC (ref 24.5–35.1)
AST SERPL-CCNC: 31 U/L (ref 0–39)
BASOPHILS ABSOLUTE: 0 E9/L (ref 0–0.2)
BASOPHILS RELATIVE PERCENT: 0 % (ref 0–2)
BILIRUB SERPL-MCNC: 0.3 MG/DL (ref 0–1.2)
BUN BLDV-MCNC: 16 MG/DL (ref 8–23)
CALCIUM SERPL-MCNC: 9.2 MG/DL (ref 8.6–10.2)
CHLORIDE BLD-SCNC: 103 MMOL/L (ref 98–107)
CO2: 28 MMOL/L (ref 22–29)
CREAT SERPL-MCNC: 1 MG/DL (ref 0.7–1.2)
EOSINOPHILS ABSOLUTE: 0.01 E9/L (ref 0.05–0.5)
EOSINOPHILS RELATIVE PERCENT: 0.3 % (ref 0–6)
GFR AFRICAN AMERICAN: >60
GFR NON-AFRICAN AMERICAN: >60 ML/MIN/1.73
GLUCOSE BLD-MCNC: 137 MG/DL (ref 74–99)
HCT VFR BLD CALC: 45.2 % (ref 37–54)
HEMOGLOBIN: 14.4 G/DL (ref 12.5–16.5)
IMMATURE GRANULOCYTES #: 0.01 E9/L
IMMATURE GRANULOCYTES %: 0.3 % (ref 0–5)
LYMPHOCYTES ABSOLUTE: 1.01 E9/L (ref 1.5–4)
LYMPHOCYTES RELATIVE PERCENT: 29.2 % (ref 20–42)
MAGNESIUM: 2 MG/DL (ref 1.6–2.6)
MCH RBC QN AUTO: 29.1 PG (ref 26–35)
MCHC RBC AUTO-ENTMCNC: 31.9 % (ref 32–34.5)
MCV RBC AUTO: 91.5 FL (ref 80–99.9)
MONOCYTES ABSOLUTE: 0.38 E9/L (ref 0.1–0.95)
MONOCYTES RELATIVE PERCENT: 11 % (ref 2–12)
NEUTROPHILS ABSOLUTE: 2.05 E9/L (ref 1.8–7.3)
NEUTROPHILS RELATIVE PERCENT: 59.2 % (ref 43–80)
PDW BLD-RTO: 12.3 FL (ref 11.5–15)
PLATELET # BLD: 140 E9/L (ref 130–450)
PMV BLD AUTO: 9.5 FL (ref 7–12)
POTASSIUM SERPL-SCNC: 4.3 MMOL/L (ref 3.5–5)
RBC # BLD: 4.94 E12/L (ref 3.8–5.8)
SODIUM BLD-SCNC: 141 MMOL/L (ref 132–146)
TOTAL PROTEIN: 8 G/DL (ref 6.4–8.3)
TROPONIN: <0.01 NG/ML (ref 0–0.03)
TSH SERPL DL<=0.05 MIU/L-ACNC: 0.87 UIU/ML (ref 0.27–4.2)
WBC # BLD: 3.5 E9/L (ref 4.5–11.5)

## 2020-12-17 PROCEDURE — 93005 ELECTROCARDIOGRAM TRACING: CPT | Performed by: NURSE PRACTITIONER

## 2020-12-17 PROCEDURE — 80053 COMPREHEN METABOLIC PANEL: CPT

## 2020-12-17 PROCEDURE — 99284 EMERGENCY DEPT VISIT MOD MDM: CPT

## 2020-12-17 PROCEDURE — 71046 X-RAY EXAM CHEST 2 VIEWS: CPT

## 2020-12-17 PROCEDURE — U0003 INFECTIOUS AGENT DETECTION BY NUCLEIC ACID (DNA OR RNA); SEVERE ACUTE RESPIRATORY SYNDROME CORONAVIRUS 2 (SARS-COV-2) (CORONAVIRUS DISEASE [COVID-19]), AMPLIFIED PROBE TECHNIQUE, MAKING USE OF HIGH THROUGHPUT TECHNOLOGIES AS DESCRIBED BY CMS-2020-01-R: HCPCS

## 2020-12-17 PROCEDURE — 84443 ASSAY THYROID STIM HORMONE: CPT

## 2020-12-17 PROCEDURE — 85025 COMPLETE CBC W/AUTO DIFF WBC: CPT

## 2020-12-17 PROCEDURE — 84484 ASSAY OF TROPONIN QUANT: CPT

## 2020-12-17 PROCEDURE — 85730 THROMBOPLASTIN TIME PARTIAL: CPT

## 2020-12-17 PROCEDURE — 83735 ASSAY OF MAGNESIUM: CPT

## 2020-12-17 NOTE — ED PROVIDER NOTES
FIRST PROVIDER CONTACT ASSESSMENT NOTE      Department of Emergency Medicine   ED  First Provider Note   12/17/20  6:51 PM EST    Chief Complaint: Headache (pending covid test ), Fatigue, and Tachycardia (per wife, 108 in triage )      History of Present Illness:    Matthew Galan is a 61 y.o. male who presents to the ED by private car girlfriend for complaints of having a rapid heart rate palpitations around 120 beats per minutes that started this a.m. He states he has had diarrhea for the past 5 days and he denies any abdominal pain, nausea or vomiting. Denies any bloody or tarry stools. Today he went to get tested for Covid and he states the test will be back till Monday. He denies any Covid exposures denies any chest pain, shortness of breath, leg pain, leg swelling, hemoptysis, recent travel or dizziness. Focused Screening Exam:  Constitutional:  Alert, appears stated age and is in no distress. *ALLERGIES*     Patient has no known allergies.      ED Triage Vitals   BP Temp Temp src Pulse Resp SpO2 Height Weight   -- -- -- -- -- -- -- --        Initial Plan of Care:  Initiate Treatment-Testing, Proceed toTreatment Area When Bed Available for ED Attending/MLP to Continue Care    -----------------END OF FIRST PROVIDER CONTACT ASSESSMENT NOTE--------------  Electronically signed by DUYEN Esquivel CNP   DD: 12/17/20     DUYEN Esquivel CNP  12/17/20 [id number]

## 2020-12-18 ENCOUNTER — CARE COORDINATION (OUTPATIENT)
Dept: CARE COORDINATION | Age: 60
End: 2020-12-18

## 2020-12-18 LAB
EKG ATRIAL RATE: 97 BPM
EKG P AXIS: 46 DEGREES
EKG P-R INTERVAL: 116 MS
EKG Q-T INTERVAL: 332 MS
EKG QRS DURATION: 82 MS
EKG QTC CALCULATION (BAZETT): 421 MS
EKG R AXIS: 44 DEGREES
EKG T AXIS: 31 DEGREES
EKG VENTRICULAR RATE: 97 BPM

## 2020-12-18 PROCEDURE — 93010 ELECTROCARDIOGRAM REPORT: CPT | Performed by: INTERNAL MEDICINE

## 2020-12-18 NOTE — CARE COORDINATION
Date/Time:  12/18/2020 11:14 AM  Attempted to reach patient by telephone. Call within 2 business days of discharge: Yes Left HIPPA compliant message requesting a return call. Will attempt to reach patient again.

## 2020-12-18 NOTE — ED PROVIDER NOTES
5.80 E12/L    Hemoglobin 14.4 12.5 - 16.5 g/dL    Hematocrit 45.2 37.0 - 54.0 %    MCV 91.5 80.0 - 99.9 fL    MCH 29.1 26.0 - 35.0 pg    MCHC 31.9 (L) 32.0 - 34.5 %    RDW 12.3 11.5 - 15.0 fL    Platelets 551 866 - 810 E9/L    MPV 9.5 7.0 - 12.0 fL    Neutrophils % 59.2 43.0 - 80.0 %    Immature Granulocytes % 0.3 0.0 - 5.0 %    Lymphocytes % 29.2 20.0 - 42.0 %    Monocytes % 11.0 2.0 - 12.0 %    Eosinophils % 0.3 0.0 - 6.0 %    Basophils % 0.0 0.0 - 2.0 %    Neutrophils Absolute 2.05 1.80 - 7.30 E9/L    Immature Granulocytes # 0.01 E9/L    Lymphocytes Absolute 1.01 (L) 1.50 - 4.00 E9/L    Monocytes Absolute 0.38 0.10 - 0.95 E9/L    Eosinophils Absolute 0.01 (L) 0.05 - 0.50 E9/L    Basophils Absolute 0.00 0.00 - 0.20 E9/L   Comprehensive Metabolic Panel   Result Value Ref Range    Sodium 141 132 - 146 mmol/L    Potassium 4.3 3.5 - 5.0 mmol/L    Chloride 103 98 - 107 mmol/L    CO2 28 22 - 29 mmol/L    Anion Gap 10 7 - 16 mmol/L    Glucose 137 (H) 74 - 99 mg/dL    BUN 16 8 - 23 mg/dL    CREATININE 1.0 0.7 - 1.2 mg/dL    GFR Non-African American >60 >=60 mL/min/1.73    GFR African American >60     Calcium 9.2 8.6 - 10.2 mg/dL    Total Protein 8.0 6.4 - 8.3 g/dL    Alb 4.2 3.5 - 5.2 g/dL    Total Bilirubin 0.3 0.0 - 1.2 mg/dL    Alkaline Phosphatase 61 40 - 129 U/L    ALT 39 0 - 40 U/L    AST 31 0 - 39 U/L   Magnesium   Result Value Ref Range    Magnesium 2.0 1.6 - 2.6 mg/dL   TSH without Reflex   Result Value Ref Range    TSH 0.872 0.270 - 4.200 uIU/mL   EKG 12 Lead   Result Value Ref Range    Ventricular Rate 97 BPM    Atrial Rate 97 BPM    P-R Interval 116 ms    QRS Duration 82 ms    Q-T Interval 332 ms    QTc Calculation (Bazett) 421 ms    P Axis 46 degrees    R Axis 44 degrees    T Axis 31 degrees       RADIOLOGY:  Interpreted by Radiologist.  XR CHEST (2 VW)   Final Result   Interstitial opacities in right infrahilar location could suggest   subsegmental atelectasis or developing pneumonia.   Short-term follow-up may   be helpful for further evaluation.          ------------------------- NURSING NOTES AND VITALS REVIEWED ---------------------------   The nursing notes within the ED encounter and vital signs as below have been reviewed. /81   Pulse 102   Temp 98.3 °F (36.8 °C) (Oral)   Resp 18   Ht 5' 11\" (1.803 m)   Wt 180 lb (81.6 kg)   SpO2 97%   BMI 25.10 kg/m²   Oxygen Saturation Interpretation: Normal      ---------------------------------------------------PHYSICAL EXAM--------------------------------------      Constitutional/General: Alert and oriented x3, well appearing, non toxic in NAD  Head: NC/AT  Eyes: PERRL, EOMI  Mouth: Oropharynx clear, handling secretions, no trismus  Neck: Supple, full ROM, no meningeal signs  Pulmonary: Lungs clear to auscultation bilaterally, no wheezes, rales, or rhonchi. Not in respiratory distress  Cardiovascular:  Regular rate and rhythm, no murmurs, gallops, or rubs. 2+ distal pulses  Abdomen: Soft, non tender, non distended,   Extremities: Moves all extremities x 4. Warm and well perfused  Skin: warm and dry without rash  Neurologic: GCS 15,  Psych: Normal Affect      ------------------------------ ED COURSE/MEDICAL DECISION MAKING----------------------  Medications - No data to display      Medical Decision Making:    Covid-like symptoms and EKG normal    EKG: Normal sinus rhythm with a rate of 97 bpm with no acute ischemic change or ectopy  Counseling: The emergency provider has spoken with the patient and discussed todays results, in addition to providing specific details for the plan of care and counseling regarding the diagnosis and prognosis. Questions are answered at this time and they are agreeable with the plan.      --------------------------------- IMPRESSION AND DISPOSITION ---------------------------------    IMPRESSION  1. Suspected COVID-19 virus infection Stable   2.  Palpitations Controlled       DISPOSITION  Disposition: Discharge to home  Patient condition is stable                  Violetta Bello MD  12/17/20 [id number]

## 2020-12-19 LAB
SARS-COV-2: DETECTED
SOURCE: ABNORMAL

## 2020-12-21 ENCOUNTER — CARE COORDINATION (OUTPATIENT)
Dept: CARE COORDINATION | Age: 60
End: 2020-12-21

## 2020-12-21 NOTE — CARE COORDINATION
Patient contacted regarding SWUTT-34 diagnosis\". Discussed COVID-19 related testing which was available at this time. Test results were positive. Patient informed of results, if available? Yes    Care Transition Nurse/ Ambulatory Care Manager contacted the patient by telephone to perform post discharge assessment. Call within 2 business days of discharge: Yes. Verified name and  with patient as identifiers. Provided introduction to self, and explanation of the CTN/ACM role, and reason for call due to risk factors for infection and/or exposure to COVID-19. Symptoms reviewed with patient who verbalized the following symptoms: fatigue, tachycardia. Due to no new or worsening symptoms encounter was not routed to provider for escalation. Discussed follow-up appointments. If no appointment was previously scheduled, appointment scheduling offered: [facility] follow up appointment(s): No future appointments. Non-[facility] follow up appointment(s):     Patient to contact PCP today. States he is mostly just fatigued. Reinforced hydration. Non-face-to-face services provided:  Obtained and reviewed discharge summary and/or continuity of care documents  Education of patient/family/caregiver/guardian to support self-management-symptom management. Advance Care Planning:   Does patient have an Advance Directive:  reviewed and needs to be updated. Patient has following risk factors of: diabetes. CTN/ACM reviewed discharge instructions, medical action plan and red flags such as increased shortness of breath, increasing fever and signs of decompensation with patient who verbalized understanding. Discussed exposure protocols and quarantine with CDC Guidelines What to do if you are sick with coronavirus disease 2019.  Patient was given an opportunity for questions and concerns.  The patient agrees to contact the [facility] exposure line [phone], [facility] department [place] [facility]: ([phone]) and PCP office for questions related to their healthcare. CTN/ACM provided contact information for future needs. Reviewed and educated patient on any new and changed medications related to discharge diagnosis     Patient/family/caregiver given information for GetWell Loop and agrees to enroll yes  Patient's preferred e-mail: [email or web address]   Patient's preferred phone number: [phone] or [phone]  Based on Loop alert triggers, patient will be contacted by nurse care manager for worsening symptoms. Pt will be further monitored by COVID Loop Team based on severity of symptoms and risk factors.

## 2022-12-12 ENCOUNTER — HOSPITAL ENCOUNTER (OUTPATIENT)
Age: 62
Discharge: HOME | End: 2022-12-14

## 2022-12-12 PROCEDURE — 88305 TISSUE EXAM BY PATHOLOGIST: CPT

## 2023-01-06 ENCOUNTER — HOSPITAL ENCOUNTER (OUTPATIENT)
Age: 63
Discharge: HOME | End: 2023-01-06

## 2023-01-07 LAB
Lab: NORMAL
THIS TEST SENT TO: NORMAL

## 2023-10-10 ENCOUNTER — TELEPHONE (OUTPATIENT)
Dept: ADMINISTRATIVE | Age: 63
End: 2023-10-10

## 2023-10-10 NOTE — TELEPHONE ENCOUNTER
Patient Appointment Form:      PCP: Dr. Jorje García  Referring: Dr. Jorje García    Has the Patient:    Seen a Cardiologist? no    Had a heart catheterization? no    Had heart surgery? no    Had a stress test or nuclear stress test? yes   date: 10/24/2017   [facility]    Had an echocardiogram? no    Had a vascular ultrasound? no    Had a 24/48 heart monitor or extended cardiac event monitor? no    Had recent blood work in the last 6 months? other: unsure    Had a pacemaker/ICD/ILR implant? no    Seen an Electrophysiologist? no        Will send records via: no cardiac records      Date & time of appointment:  [email or web address]

## 2023-10-23 ENCOUNTER — TELEPHONE (OUTPATIENT)
Dept: CARDIOLOGY | Age: 63
End: 2023-10-23

## 2023-10-23 ENCOUNTER — OFFICE VISIT (OUTPATIENT)
Dept: CARDIOLOGY CLINIC | Age: 63
End: 2023-10-23
Payer: COMMERCIAL

## 2023-10-23 VITALS
WEIGHT: 161 LBS | BODY MASS INDEX: 22.45 KG/M2 | SYSTOLIC BLOOD PRESSURE: 118 MMHG | HEART RATE: 66 BPM | DIASTOLIC BLOOD PRESSURE: 72 MMHG

## 2023-10-23 DIAGNOSIS — E08.00 DIABETES MELLITUS DUE TO UNDERLYING CONDITION WITH HYPEROSMOLARITY WITHOUT COMA, UNSPECIFIED WHETHER LONG TERM INSULIN USE (HCC): ICD-10-CM

## 2023-10-23 DIAGNOSIS — E78.5 DYSLIPIDEMIA: ICD-10-CM

## 2023-10-23 DIAGNOSIS — F10.10 ALCOHOL ABUSE: ICD-10-CM

## 2023-10-23 DIAGNOSIS — S52.501D CLOSED FRACTURE OF DISTAL END OF RIGHT RADIUS WITH ROUTINE HEALING, UNSPECIFIED FRACTURE MORPHOLOGY, SUBSEQUENT ENCOUNTER: ICD-10-CM

## 2023-10-23 DIAGNOSIS — I20.8 OTHER FORMS OF ANGINA PECTORIS: ICD-10-CM

## 2023-10-23 DIAGNOSIS — R06.09 DOE (DYSPNEA ON EXERTION): ICD-10-CM

## 2023-10-23 DIAGNOSIS — S63.012A: ICD-10-CM

## 2023-10-23 DIAGNOSIS — I10 ESSENTIAL HYPERTENSION: ICD-10-CM

## 2023-10-23 DIAGNOSIS — R00.2 PALPITATION: Primary | ICD-10-CM

## 2023-10-23 DIAGNOSIS — Z82.49 FAMILY HISTORY OF EARLY CAD: ICD-10-CM

## 2023-10-23 PROBLEM — I20.89 OTHER FORMS OF ANGINA PECTORIS (HCC): Status: ACTIVE | Noted: 2023-10-23

## 2023-10-23 PROCEDURE — 3017F COLORECTAL CA SCREEN DOC REV: CPT | Performed by: INTERNAL MEDICINE

## 2023-10-23 PROCEDURE — G8484 FLU IMMUNIZE NO ADMIN: HCPCS | Performed by: INTERNAL MEDICINE

## 2023-10-23 PROCEDURE — G8420 CALC BMI NORM PARAMETERS: HCPCS | Performed by: INTERNAL MEDICINE

## 2023-10-23 PROCEDURE — 4004F PT TOBACCO SCREEN RCVD TLK: CPT | Performed by: INTERNAL MEDICINE

## 2023-10-23 PROCEDURE — 99204 OFFICE O/P NEW MOD 45 MIN: CPT | Performed by: INTERNAL MEDICINE

## 2023-10-23 PROCEDURE — 3078F DIAST BP <80 MM HG: CPT | Performed by: INTERNAL MEDICINE

## 2023-10-23 PROCEDURE — G8427 DOCREV CUR MEDS BY ELIG CLIN: HCPCS | Performed by: INTERNAL MEDICINE

## 2023-10-23 PROCEDURE — 93000 ELECTROCARDIOGRAM COMPLETE: CPT | Performed by: INTERNAL MEDICINE

## 2023-10-23 PROCEDURE — 3074F SYST BP LT 130 MM HG: CPT | Performed by: INTERNAL MEDICINE

## 2023-10-23 RX ORDER — AZELASTINE 1 MG/ML
SPRAY, METERED NASAL
COMMUNITY
Start: 2023-03-11

## 2023-10-23 NOTE — PROGRESS NOTES
Patient was seen today and a 14 day  monitor was placed. Monitor was ordered by Dr. Lara Laurent . The monitor was applied, instructions were given to the patient. Patient stated understanding and gave verbalize readback.    Monitor company: [employer]  Serial number: [id number]

## 2023-11-04 ENCOUNTER — TRANSCRIBE ORDERS (OUTPATIENT)
Dept: ADMINISTRATIVE | Age: 63
End: 2023-11-04

## 2023-11-04 DIAGNOSIS — R00.2 PALPITATION: Primary | ICD-10-CM

## 2023-11-04 DIAGNOSIS — F10.10 ALCOHOL ABUSE: ICD-10-CM

## 2023-11-04 DIAGNOSIS — R06.09 DOE (DYSPNEA ON EXERTION): ICD-10-CM

## 2023-11-04 DIAGNOSIS — Z82.49 FAMILY HISTORY OF EARLY CAD: ICD-10-CM

## 2023-11-04 DIAGNOSIS — I20.8 OTHER FORMS OF ANGINA PECTORIS: ICD-10-CM

## 2023-11-21 ENCOUNTER — TELEPHONE (OUTPATIENT)
Dept: CARDIOLOGY CLINIC | Age: 63
End: 2023-11-21

## 2023-11-21 NOTE — TELEPHONE ENCOUNTER
Tia from [facility] - [place], [place] called in Patient ha 90 sec strip of rapid Afib Oct 26 2023 100-199 BPM.

## 2023-11-22 ENCOUNTER — TELEPHONE (OUTPATIENT)
Dept: CARDIOLOGY CLINIC | Age: 63
End: 2023-11-22

## 2023-11-22 NOTE — TELEPHONE ENCOUNTER
Left voicemail for patient to call the office. Encounter was signed.  Will need a chart addended when patient calls back

## 2023-11-24 DIAGNOSIS — R00.2 PALPITATION: ICD-10-CM

## 2023-11-24 DIAGNOSIS — R06.09 DOE (DYSPNEA ON EXERTION): ICD-10-CM

## 2023-11-24 DIAGNOSIS — F10.10 ALCOHOL ABUSE: ICD-10-CM

## 2023-11-24 DIAGNOSIS — I20.8 OTHER FORMS OF ANGINA PECTORIS: ICD-10-CM

## 2023-11-24 DIAGNOSIS — Z82.49 FAMILY HISTORY OF EARLY CAD: ICD-10-CM

## 2023-11-24 RX ORDER — METOPROLOL SUCCINATE 25 MG/1
25 TABLET, EXTENDED RELEASE ORAL DAILY
COMMUNITY
End: 2023-11-27 | Stop reason: SDUPTHER

## 2023-11-24 NOTE — TELEPHONE ENCOUNTER
Patient notified of Afib and medication changes .  Chart reflects changes and Prescription E-scribed

## 2023-11-27 RX ORDER — METOPROLOL SUCCINATE 25 MG/1
25 TABLET, EXTENDED RELEASE ORAL DAILY
Qty: 30 TABLET | Refills: 5 | Status: SHIPPED | OUTPATIENT
Start: 2023-11-27

## 2023-11-29 RX ORDER — SODIUM CHLORIDE 9 MG/ML
INJECTION, SOLUTION INTRAVENOUS CONTINUOUS
OUTPATIENT
Start: 2023-12-05

## 2023-11-30 ENCOUNTER — HOSPITAL ENCOUNTER (OUTPATIENT)
Dept: PREADMISSION TESTING | Age: 63
Discharge: HOME | End: 2023-11-30
Payer: COMMERCIAL

## 2023-11-30 ENCOUNTER — TELEPHONE (OUTPATIENT)
Dept: CARDIOLOGY CLINIC | Age: 63
End: 2023-11-30

## 2023-11-30 VITALS
DIASTOLIC BLOOD PRESSURE: 83 MMHG | RESPIRATION RATE: 16 BRPM | TEMPERATURE: 98 F | SYSTOLIC BLOOD PRESSURE: 143 MMHG | HEART RATE: 63 BPM

## 2023-11-30 DIAGNOSIS — Z01.818 PREOP TESTING: Primary | ICD-10-CM

## 2023-11-30 LAB
ALBUMIN SERPL-MCNC: 4.4 G/DL (ref 3.5–5.2)
ALP SERPL-CCNC: 44 U/L (ref 40–129)
ALT SERPL-CCNC: 17 U/L (ref 0–40)
ANION GAP SERPL CALCULATED.3IONS-SCNC: 8 MMOL/L (ref 7–16)
AST SERPL-CCNC: 14 U/L (ref 0–39)
BASOPHILS # BLD: 0.03 K/UL (ref 0–0.2)
BASOPHILS NFR BLD: 1 % (ref 0–2)
BILIRUB SERPL-MCNC: 0.4 MG/DL (ref 0–1.2)
BUN SERPL-MCNC: 15 MG/DL (ref 6–23)
CALCIUM SERPL-MCNC: 9.3 MG/DL (ref 8.6–10.2)
CHLORIDE SERPL-SCNC: 105 MMOL/L (ref 98–107)
CO2 SERPL-SCNC: 30 MMOL/L (ref 22–29)
CREAT SERPL-MCNC: 0.9 MG/DL (ref 0.7–1.2)
EOSINOPHIL # BLD: 0.05 K/UL (ref 0.05–0.5)
EOSINOPHILS RELATIVE PERCENT: 1 % (ref 0–6)
ERYTHROCYTE [DISTWIDTH] IN BLOOD BY AUTOMATED COUNT: 12.6 % (ref 11.5–15)
GFR SERPL CREATININE-BSD FRML MDRD: >60 ML/MIN/1.73M2
GLUCOSE SERPL-MCNC: 96 MG/DL (ref 74–99)
HCT VFR BLD AUTO: 41.1 % (ref 37–54)
HGB BLD-MCNC: 13.5 G/DL (ref 12.5–16.5)
IMM GRANULOCYTES # BLD AUTO: <0.03 K/UL (ref 0–0.58)
IMM GRANULOCYTES NFR BLD: 0 % (ref 0–5)
LYMPHOCYTES NFR BLD: 1.11 K/UL (ref 1.5–4)
LYMPHOCYTES RELATIVE PERCENT: 19 % (ref 20–42)
MCH RBC QN AUTO: 30.1 PG (ref 26–35)
MCHC RBC AUTO-ENTMCNC: 32.8 G/DL (ref 32–34.5)
MCV RBC AUTO: 91.7 FL (ref 80–99.9)
MONOCYTES NFR BLD: 0.45 K/UL (ref 0.1–0.95)
MONOCYTES NFR BLD: 8 % (ref 2–12)
NEUTROPHILS NFR BLD: 72 % (ref 43–80)
NEUTS SEG NFR BLD: 4.2 K/UL (ref 1.8–7.3)
PLATELET, FLUORESCENCE: 214 K/UL (ref 130–450)
PMV BLD AUTO: 9.8 FL (ref 7–12)
POTASSIUM SERPL-SCNC: 4.1 MMOL/L (ref 3.5–5)
PROT SERPL-MCNC: 7.1 G/DL (ref 6.4–8.3)
RBC # BLD AUTO: 4.48 M/UL (ref 3.8–5.8)
SODIUM SERPL-SCNC: 143 MMOL/L (ref 132–146)
WBC OTHER # BLD: 5.9 K/UL (ref 4.5–11.5)

## 2023-11-30 PROCEDURE — 80053 COMPREHEN METABOLIC PANEL: CPT

## 2023-11-30 PROCEDURE — 85025 COMPLETE CBC W/AUTO DIFF WBC: CPT

## 2023-11-30 ASSESSMENT — PAIN DESCRIPTION - LOCATION: LOCATION: TOE (COMMENT WHICH ONE)

## 2023-11-30 ASSESSMENT — PAIN DESCRIPTION - PAIN TYPE: TYPE: CHRONIC PAIN

## 2023-11-30 ASSESSMENT — PAIN SCALES - GENERAL: PAINLEVEL_OUTOF10: 7

## 2023-11-30 ASSESSMENT — PAIN DESCRIPTION - ONSET: ONSET: ON-GOING

## 2023-11-30 ASSESSMENT — PAIN DESCRIPTION - FREQUENCY: FREQUENCY: CONTINUOUS

## 2023-11-30 NOTE — TELEPHONE ENCOUNTER
Adriana Squires from [address] needs to know if the patient can be cleared for foot surgery or does he have to wait for Stress and Echo to be completed? Patient does have Afib and would need to hold Eliquis for surgery. Please Advise Patient's surgery is 12/5/23 at noon.

## 2023-11-30 NOTE — PROGRESS NOTES
[facility]                                                                                                                    PRE OP INSTRUCTIONS FOR  Pamela Plascencia        Date: 11/30/2023    Date of surgery: 12/5/23   Arrival Time: Hospital will call you between 5pm and 7pm with your final arrival time for surgery    Do not eat or drink anything after midnight prior to surgery. This includes no water, chewing gum, mints or ice chips. Take the following medications with a small sip of water on the morning of Surgery: metoprolol     Diabetics   If you feel symptomatic or low blood sugar morning of surgery drink 1-2 ounces of apple juice only. Aspirin, Ibuprofen, Advil, Naproxen, Vitamin E and other Anti-inflammatory products should be stopped  before surgery  as directed by your physician. Take Tylenol only unless instructed otherwise by your surgeon. Check with your Doctor regarding stopping Plavix, Coumadin, Lovenox, Eliquis, Effient, or other blood thinners. Do not smoke,use illicit drugs and do not drink any alcoholic beverages 24 hours prior to surgery. You may brush your teeth the morning of surgery. DO NOT SWALLOW WATER    You MUST make arrangements for a responsible adult to take you home after your surgery. You will not be allowed to leave alone or drive yourself home. It is strongly suggested someone stay with you the first 24 hrs. Your surgery will be cancelled if you do not have a ride home. Please wear simple, loose fitting clothing to the hospital.  Yeni Homans not bring valuables (money, credit cards, checkbooks, etc.) Do not wear any makeup (including no eye makeup) or nail polish on your fingers or toes. DO NOT wear any jewelry or piercings on day of surgery. All body piercing jewelry must be removed.     Shower the night before surgery with _x__Antibacterial soap /CLARITZA WIPES__x______          Notify your Surgeon if you develop any illness between now and

## 2023-11-30 NOTE — PROGRESS NOTES
Spoke with Dr Jessika Adams re recent afib  diag. Pt has not started Eliquis or metoprolol yet. Will call Dr Nina Swan 's office to see if OK to proceed. Campbell Myers RN  11/30/2023  [address]. Left  voice message with Milka. Asked if OK to proceed with general anesthesia for foot surgery or if pt needs stress and echo done pre-op. Campbell Myers RN  11/30/2023  2:51 PM  Called Dr Matthias Marcum office for clearance and to notify of above. Office is closed until tomorrow. Campbell Myers RN  11/30/2023  2:55 PM  No answer at Dr Jh Calvin office  Campbell Myers RN  . td

## 2023-12-04 ENCOUNTER — ANESTHESIA EVENT (OUTPATIENT)
Dept: OPERATING ROOM | Age: 63
End: 2023-12-04

## 2023-12-05 ENCOUNTER — ANESTHESIA (OUTPATIENT)
Dept: OPERATING ROOM | Age: 63
End: 2023-12-05

## 2023-12-06 NOTE — TELEPHONE ENCOUNTER
Patient notified of Dr. Poornima Ma recommendations and given the number to call to get stress and echo scheduled [phone].

## 2023-12-12 ENCOUNTER — TELEPHONE (OUTPATIENT)
Dept: CARDIOLOGY | Age: 63
End: 2023-12-12

## 2023-12-12 NOTE — TELEPHONE ENCOUNTER
Good Morning -  Received notification from authorization request that case is requesting further information from provider and/or [id number].    Patient is scheduled 12/21 for stress test and echo (Echo has been approved. Stress test is pending).    Patient order ID#: [id number]  Please call: [phone] to attempt p2p.    Please advise how you wish to proceed.  Thank you!

## 2023-12-13 NOTE — TELEPHONE ENCOUNTER
Please be sure to include Lyndsey Lopez on the status of this case as I will likely be out of office soon. I want to make sure the updates are known by other staff handling the case. Thank you!

## 2023-12-13 NOTE — TELEPHONE ENCOUNTER
Good Morning - Received update on patient status.  Auth status is now listed as denied.  There is still opportunity to complete P2P if you wish.    The Echo is approved and will remain on the schedule.  Please advise how you wish to proceed as far as the stress test.    Thank you!

## 2023-12-18 ENCOUNTER — TELEPHONE (OUTPATIENT)
Dept: CARDIOLOGY | Age: 63
End: 2023-12-18

## 2023-12-18 NOTE — TELEPHONE ENCOUNTER
Stress for 23 auth denied thru [employer].    Can try Peer-2-Peer.    [phone]    Trackin.    Please advise.    Electronically signed by Faina Hernandez on 2023 at 1:11 PM

## 2024-01-08 ENCOUNTER — TELEPHONE (OUTPATIENT)
Dept: CARDIOLOGY CLINIC | Age: 64
End: 2024-01-08

## 2024-01-08 NOTE — TELEPHONE ENCOUNTER
----- Message from Silvestre Lyons MD sent at 1/8/2024  9:58 AM EST -----  Heart is pumping fine.  Details will be discussed during follow-up visit.  
Patient notified of Echo results and follow up scheduled for 2/2/24.  
Mother's Bedside/Non-

## 2024-05-21 RX ORDER — METOPROLOL SUCCINATE 25 MG/1
25 TABLET, EXTENDED RELEASE ORAL DAILY
Qty: 30 TABLET | Refills: 5 | Status: SHIPPED | OUTPATIENT
Start: 2024-05-21

## 2024-09-13 ENCOUNTER — TELEPHONE (OUTPATIENT)
Dept: ADMINISTRATIVE | Age: 64
End: 2024-09-13

## 2024-09-13 NOTE — TELEPHONE ENCOUNTER
Patient is currently under Dr. Lyons's care. He no longer wants to see him. He wants to transfer to Dr. Gan. He wants to know if he would be willing to take him on as a patient?

## 2024-11-19 RX ORDER — METOPROLOL SUCCINATE 25 MG/1
25 TABLET, EXTENDED RELEASE ORAL DAILY
Qty: 30 TABLET | Refills: 2 | Status: SHIPPED | OUTPATIENT
Start: 2024-11-19

## 2025-02-17 RX ORDER — METOPROLOL SUCCINATE 25 MG/1
25 TABLET, EXTENDED RELEASE ORAL DAILY
Qty: 30 TABLET | Refills: 0 | Status: SHIPPED | OUTPATIENT
Start: 2025-02-17

## 2025-03-18 RX ORDER — METOPROLOL SUCCINATE 25 MG/1
25 TABLET, EXTENDED RELEASE ORAL DAILY
Qty: 30 TABLET | Refills: 0 | Status: SHIPPED | OUTPATIENT
Start: 2025-03-18

## 2025-04-21 ENCOUNTER — TELEPHONE (OUTPATIENT)
Dept: CARDIOLOGY CLINIC | Age: 65
End: 2025-04-21

## 2025-04-21 RX ORDER — METOPROLOL SUCCINATE 25 MG/1
25 TABLET, EXTENDED RELEASE ORAL DAILY
Qty: 30 TABLET | Refills: 0 | OUTPATIENT
Start: 2025-04-21
